# Patient Record
Sex: FEMALE | Race: WHITE | Employment: UNEMPLOYED | URBAN - METROPOLITAN AREA
[De-identification: names, ages, dates, MRNs, and addresses within clinical notes are randomized per-mention and may not be internally consistent; named-entity substitution may affect disease eponyms.]

---

## 2017-05-05 ENCOUNTER — TELEPHONE (OUTPATIENT)
Dept: CASE MANAGEMENT | Age: 32
End: 2017-05-05

## 2017-05-05 NOTE — TELEPHONE ENCOUNTER
I spoke with this patient by phone upon referral of the patient's PCP, Dr. Corrine Callejas. The patient has a history if bipolar affective disorder, Type I. In talking with the patient, she has also had post partum depression with the birth of her second child, delivered in late December. Her speech is noted to be pressured. The patient has an appointment with a psychiatrist at the [facility] in [place], but that is not until November. I have suggested that as the patient is citing a number of current stressors that she call today and make an appointment to see a therapist and possibly, the therapist there might be able to affect an appointment earlier than November for her. The patient agrees to do this as well. YANA Armenta.

## 2018-03-10 ENCOUNTER — HOSPITAL ENCOUNTER (EMERGENCY)
Age: 33
Discharge: HOME | End: 2018-03-10
Attending: EMERGENCY MEDICINE | Admitting: SURGERY
Payer: COMMERCIAL

## 2018-03-10 ENCOUNTER — APPOINTMENT (OUTPATIENT)
Dept: GENERAL RADIOLOGY | Age: 33
End: 2018-03-10
Attending: EMERGENCY MEDICINE
Payer: COMMERCIAL

## 2018-03-10 ENCOUNTER — ANESTHESIA (OUTPATIENT)
Dept: SURGERY | Age: 33
End: 2018-03-10
Payer: COMMERCIAL

## 2018-03-10 ENCOUNTER — APPOINTMENT (OUTPATIENT)
Dept: ULTRASOUND IMAGING | Age: 33
End: 2018-03-10
Attending: EMERGENCY MEDICINE
Payer: COMMERCIAL

## 2018-03-10 ENCOUNTER — ANESTHESIA EVENT (OUTPATIENT)
Dept: SURGERY | Age: 33
End: 2018-03-10
Payer: COMMERCIAL

## 2018-03-10 VITALS
SYSTOLIC BLOOD PRESSURE: 114 MMHG | TEMPERATURE: 97.7 F | RESPIRATION RATE: 17 BRPM | WEIGHT: 235 LBS | DIASTOLIC BLOOD PRESSURE: 62 MMHG | HEART RATE: 73 BPM | HEIGHT: 64 IN | BODY MASS INDEX: 40.12 KG/M2 | OXYGEN SATURATION: 96 %

## 2018-03-10 DIAGNOSIS — K80.20 CALCULUS OF GALLBLADDER WITHOUT CHOLECYSTITIS WITHOUT OBSTRUCTION: Primary | ICD-10-CM

## 2018-03-10 LAB
ALBUMIN SERPL-MCNC: 3.2 G/DL (ref 3.5–5)
ALBUMIN/GLOB SERPL: 0.7 {RATIO} (ref 1.2–3.5)
ALP SERPL-CCNC: 99 U/L (ref 50–136)
ALT SERPL-CCNC: 33 U/L (ref 12–65)
ANION GAP SERPL CALC-SCNC: 10 MMOL/L (ref 7–16)
AST SERPL-CCNC: 25 U/L (ref 15–37)
ATRIAL RATE: 73 BPM
BACTERIA URNS QL MICRO: NORMAL /HPF
BASOPHILS # BLD: 0 K/UL (ref 0–0.2)
BASOPHILS NFR BLD: 0 % (ref 0–2)
BILIRUB SERPL-MCNC: 0.3 MG/DL (ref 0.2–1.1)
BUN SERPL-MCNC: 5 MG/DL (ref 6–23)
CALCIUM SERPL-MCNC: 9.2 MG/DL (ref 8.3–10.4)
CALCULATED P AXIS, ECG09: 33 DEGREES
CALCULATED R AXIS, ECG10: 68 DEGREES
CALCULATED T AXIS, ECG11: 14 DEGREES
CASTS URNS QL MICRO: NORMAL /LPF
CHLORIDE SERPL-SCNC: 101 MMOL/L (ref 98–107)
CO2 SERPL-SCNC: 26 MMOL/L (ref 21–32)
CREAT SERPL-MCNC: 0.59 MG/DL (ref 0.6–1)
DIAGNOSIS, 93000: NORMAL
DIFFERENTIAL METHOD BLD: ABNORMAL
EOSINOPHIL # BLD: 0.1 K/UL (ref 0–0.8)
EOSINOPHIL NFR BLD: 1 % (ref 0.5–7.8)
EPI CELLS #/AREA URNS HPF: NORMAL /HPF
ERYTHROCYTE [DISTWIDTH] IN BLOOD BY AUTOMATED COUNT: 14.1 % (ref 11.9–14.6)
GLOBULIN SER CALC-MCNC: 4.9 G/DL (ref 2.3–3.5)
GLUCOSE SERPL-MCNC: 98 MG/DL (ref 65–100)
HCG UR QL: NEGATIVE
HCT VFR BLD AUTO: 40.7 % (ref 35.8–46.3)
HGB BLD-MCNC: 13.7 G/DL (ref 11.7–15.4)
IMM GRANULOCYTES # BLD: 0 K/UL (ref 0–0.5)
IMM GRANULOCYTES NFR BLD AUTO: 0 % (ref 0–5)
LIPASE SERPL-CCNC: 92 U/L (ref 73–393)
LYMPHOCYTES # BLD: 2.3 K/UL (ref 0.5–4.6)
LYMPHOCYTES NFR BLD: 21 % (ref 13–44)
MCH RBC QN AUTO: 27.1 PG (ref 26.1–32.9)
MCHC RBC AUTO-ENTMCNC: 33.7 G/DL (ref 31.4–35)
MCV RBC AUTO: 80.6 FL (ref 79.6–97.8)
MONOCYTES # BLD: 1.2 K/UL (ref 0.1–1.3)
MONOCYTES NFR BLD: 11 % (ref 4–12)
NEUTS SEG # BLD: 7.3 K/UL (ref 1.7–8.2)
NEUTS SEG NFR BLD: 67 % (ref 43–78)
P-R INTERVAL, ECG05: 154 MS
PLATELET # BLD AUTO: 386 K/UL (ref 150–450)
PMV BLD AUTO: 9.6 FL (ref 10.8–14.1)
POTASSIUM SERPL-SCNC: 3.7 MMOL/L (ref 3.5–5.1)
PROT SERPL-MCNC: 8.1 G/DL (ref 6.3–8.2)
Q-T INTERVAL, ECG07: 382 MS
QRS DURATION, ECG06: 82 MS
QTC CALCULATION (BEZET), ECG08: 420 MS
RBC # BLD AUTO: 5.05 M/UL (ref 4.05–5.25)
RBC #/AREA URNS HPF: NORMAL /HPF
SODIUM SERPL-SCNC: 137 MMOL/L (ref 136–145)
TROPONIN I SERPL-MCNC: <0.02 NG/ML (ref 0.02–0.05)
VENTRICULAR RATE, ECG03: 73 BPM
WBC # BLD AUTO: 10.9 K/UL (ref 4.3–11.1)
WBC URNS QL MICRO: NORMAL /HPF

## 2018-03-10 PROCEDURE — 76210000006 HC OR PH I REC 0.5 TO 1 HR: Performed by: SURGERY

## 2018-03-10 PROCEDURE — 77030008522 HC TBNG INSUF LAPRO STRY -B: Performed by: SURGERY

## 2018-03-10 PROCEDURE — 76210000020 HC REC RM PH II FIRST 0.5 HR: Performed by: SURGERY

## 2018-03-10 PROCEDURE — 83690 ASSAY OF LIPASE: CPT | Performed by: EMERGENCY MEDICINE

## 2018-03-10 PROCEDURE — 74011250636 HC RX REV CODE- 250/636

## 2018-03-10 PROCEDURE — 77030009851 HC PCH RTVR ENDOSC AMR -B: Performed by: SURGERY

## 2018-03-10 PROCEDURE — 96375 TX/PRO/DX INJ NEW DRUG ADDON: CPT | Performed by: EMERGENCY MEDICINE

## 2018-03-10 PROCEDURE — 77030020782 HC GWN BAIR PAWS FLX 3M -B: Performed by: ANESTHESIOLOGY

## 2018-03-10 PROCEDURE — 76705 ECHO EXAM OF ABDOMEN: CPT

## 2018-03-10 PROCEDURE — 77030012022 HC APPL CLP ENDOSC COVD -C: Performed by: SURGERY

## 2018-03-10 PROCEDURE — 77030011640 HC PAD GRND REM COVD -A: Performed by: SURGERY

## 2018-03-10 PROCEDURE — 81025 URINE PREGNANCY TEST: CPT

## 2018-03-10 PROCEDURE — 80053 COMPREHEN METABOLIC PANEL: CPT | Performed by: EMERGENCY MEDICINE

## 2018-03-10 PROCEDURE — 74011250637 HC RX REV CODE- 250/637: Performed by: ANESTHESIOLOGY

## 2018-03-10 PROCEDURE — 76060000034 HC ANESTHESIA 1.5 TO 2 HR: Performed by: SURGERY

## 2018-03-10 PROCEDURE — 77030031139 HC SUT VCRL2 J&J -A: Performed by: SURGERY

## 2018-03-10 PROCEDURE — 74011250636 HC RX REV CODE- 250/636: Performed by: SURGERY

## 2018-03-10 PROCEDURE — 77030008703 HC TU ET UNCUF COVD -A: Performed by: ANESTHESIOLOGY

## 2018-03-10 PROCEDURE — 99285 EMERGENCY DEPT VISIT HI MDM: CPT | Performed by: EMERGENCY MEDICINE

## 2018-03-10 PROCEDURE — 85025 COMPLETE CBC W/AUTO DIFF WBC: CPT | Performed by: EMERGENCY MEDICINE

## 2018-03-10 PROCEDURE — 77030032490 HC SLV COMPR SCD KNE COVD -B: Performed by: SURGERY

## 2018-03-10 PROCEDURE — 84484 ASSAY OF TROPONIN QUANT: CPT | Performed by: EMERGENCY MEDICINE

## 2018-03-10 PROCEDURE — 74011000258 HC RX REV CODE- 258: Performed by: SURGERY

## 2018-03-10 PROCEDURE — 93005 ELECTROCARDIOGRAM TRACING: CPT | Performed by: EMERGENCY MEDICINE

## 2018-03-10 PROCEDURE — 81003 URINALYSIS AUTO W/O SCOPE: CPT | Performed by: EMERGENCY MEDICINE

## 2018-03-10 PROCEDURE — 96376 TX/PRO/DX INJ SAME DRUG ADON: CPT | Performed by: EMERGENCY MEDICINE

## 2018-03-10 PROCEDURE — 96372 THER/PROPH/DIAG INJ SC/IM: CPT | Performed by: EMERGENCY MEDICINE

## 2018-03-10 PROCEDURE — 74011000250 HC RX REV CODE- 250

## 2018-03-10 PROCEDURE — 77030035220 HC TRCR ENDOSC BLNTPRT ANCHR COVD -B: Performed by: SURGERY

## 2018-03-10 PROCEDURE — 77030016151 HC PROTCTR LNS DFOG COVD -B: Performed by: SURGERY

## 2018-03-10 PROCEDURE — 77030008477 HC STYL SATN SLP COVD -A: Performed by: ANESTHESIOLOGY

## 2018-03-10 PROCEDURE — 74011250636 HC RX REV CODE- 250/636: Performed by: EMERGENCY MEDICINE

## 2018-03-10 PROCEDURE — 76010000162 HC OR TIME 1.5 TO 2 HR INTENSV-TIER 1: Performed by: SURGERY

## 2018-03-10 PROCEDURE — 77030035051: Performed by: SURGERY

## 2018-03-10 PROCEDURE — 74011000250 HC RX REV CODE- 250: Performed by: SURGERY

## 2018-03-10 PROCEDURE — 96361 HYDRATE IV INFUSION ADD-ON: CPT | Performed by: EMERGENCY MEDICINE

## 2018-03-10 PROCEDURE — 81015 MICROSCOPIC EXAM OF URINE: CPT | Performed by: EMERGENCY MEDICINE

## 2018-03-10 PROCEDURE — 77030009403 HC ELECTRD ENDO MEGA -B: Performed by: SURGERY

## 2018-03-10 PROCEDURE — 77030037892: Performed by: SURGERY

## 2018-03-10 PROCEDURE — 77030035048 HC TRCR ENDOSC OPTCL COVD -B: Performed by: SURGERY

## 2018-03-10 PROCEDURE — 74022 RADEX COMPL AQT ABD SERIES: CPT

## 2018-03-10 PROCEDURE — 77030008756 HC TU IRR SUC STRY -B: Performed by: SURGERY

## 2018-03-10 PROCEDURE — 96374 THER/PROPH/DIAG INJ IV PUSH: CPT | Performed by: EMERGENCY MEDICINE

## 2018-03-10 PROCEDURE — 77030018836 HC SOL IRR NACL ICUM -A: Performed by: SURGERY

## 2018-03-10 PROCEDURE — 74011250636 HC RX REV CODE- 250/636: Performed by: ANESTHESIOLOGY

## 2018-03-10 PROCEDURE — 88304 TISSUE EXAM BY PATHOLOGIST: CPT | Performed by: SURGERY

## 2018-03-10 RX ORDER — PROPOFOL 10 MG/ML
INJECTION, EMULSION INTRAVENOUS AS NEEDED
Status: DISCONTINUED | OUTPATIENT
Start: 2018-03-10 | End: 2018-03-10 | Stop reason: HOSPADM

## 2018-03-10 RX ORDER — HYDROMORPHONE HYDROCHLORIDE 2 MG/ML
1 INJECTION, SOLUTION INTRAMUSCULAR; INTRAVENOUS; SUBCUTANEOUS
Status: COMPLETED | OUTPATIENT
Start: 2018-03-10 | End: 2018-03-10

## 2018-03-10 RX ORDER — NEOSTIGMINE METHYLSULFATE 1 MG/ML
INJECTION INTRAVENOUS AS NEEDED
Status: DISCONTINUED | OUTPATIENT
Start: 2018-03-10 | End: 2018-03-10 | Stop reason: HOSPADM

## 2018-03-10 RX ORDER — MIDAZOLAM HYDROCHLORIDE 1 MG/ML
2 INJECTION, SOLUTION INTRAMUSCULAR; INTRAVENOUS
Status: DISCONTINUED | OUTPATIENT
Start: 2018-03-10 | End: 2018-03-10 | Stop reason: HOSPADM

## 2018-03-10 RX ORDER — LIDOCAINE HYDROCHLORIDE 20 MG/ML
INJECTION, SOLUTION EPIDURAL; INFILTRATION; INTRACAUDAL; PERINEURAL AS NEEDED
Status: DISCONTINUED | OUTPATIENT
Start: 2018-03-10 | End: 2018-03-10 | Stop reason: HOSPADM

## 2018-03-10 RX ORDER — HYDROMORPHONE HYDROCHLORIDE 2 MG/ML
0.5 INJECTION, SOLUTION INTRAMUSCULAR; INTRAVENOUS; SUBCUTANEOUS
Status: DISCONTINUED | OUTPATIENT
Start: 2018-03-10 | End: 2018-03-10 | Stop reason: HOSPADM

## 2018-03-10 RX ORDER — DIPHENHYDRAMINE HYDROCHLORIDE 50 MG/ML
12.5 INJECTION, SOLUTION INTRAMUSCULAR; INTRAVENOUS
Status: DISCONTINUED | OUTPATIENT
Start: 2018-03-10 | End: 2018-03-10 | Stop reason: HOSPADM

## 2018-03-10 RX ORDER — OXYCODONE HYDROCHLORIDE 5 MG/1
5 TABLET ORAL
Status: DISCONTINUED | OUTPATIENT
Start: 2018-03-10 | End: 2018-03-10 | Stop reason: HOSPADM

## 2018-03-10 RX ORDER — OXYCODONE HYDROCHLORIDE 5 MG/1
10 TABLET ORAL
Status: DISCONTINUED | OUTPATIENT
Start: 2018-03-10 | End: 2018-03-10 | Stop reason: HOSPADM

## 2018-03-10 RX ORDER — DEXAMETHASONE SODIUM PHOSPHATE 4 MG/ML
INJECTION, SOLUTION INTRA-ARTICULAR; INTRALESIONAL; INTRAMUSCULAR; INTRAVENOUS; SOFT TISSUE AS NEEDED
Status: DISCONTINUED | OUTPATIENT
Start: 2018-03-10 | End: 2018-03-10 | Stop reason: HOSPADM

## 2018-03-10 RX ORDER — ROCURONIUM BROMIDE 10 MG/ML
INJECTION, SOLUTION INTRAVENOUS AS NEEDED
Status: DISCONTINUED | OUTPATIENT
Start: 2018-03-10 | End: 2018-03-10 | Stop reason: HOSPADM

## 2018-03-10 RX ORDER — SODIUM CHLORIDE, SODIUM LACTATE, POTASSIUM CHLORIDE, CALCIUM CHLORIDE 600; 310; 30; 20 MG/100ML; MG/100ML; MG/100ML; MG/100ML
100 INJECTION, SOLUTION INTRAVENOUS CONTINUOUS
Status: DISCONTINUED | OUTPATIENT
Start: 2018-03-10 | End: 2018-03-10 | Stop reason: HOSPADM

## 2018-03-10 RX ORDER — MORPHINE SULFATE 2 MG/ML
4 INJECTION, SOLUTION INTRAMUSCULAR; INTRAVENOUS
Status: COMPLETED | OUTPATIENT
Start: 2018-03-10 | End: 2018-03-10

## 2018-03-10 RX ORDER — GLYCOPYRROLATE 0.2 MG/ML
INJECTION INTRAMUSCULAR; INTRAVENOUS AS NEEDED
Status: DISCONTINUED | OUTPATIENT
Start: 2018-03-10 | End: 2018-03-10 | Stop reason: HOSPADM

## 2018-03-10 RX ORDER — KETOROLAC TROMETHAMINE 30 MG/ML
INJECTION, SOLUTION INTRAMUSCULAR; INTRAVENOUS AS NEEDED
Status: DISCONTINUED | OUTPATIENT
Start: 2018-03-10 | End: 2018-03-10 | Stop reason: HOSPADM

## 2018-03-10 RX ORDER — ALBUTEROL SULFATE 0.83 MG/ML
2.5 SOLUTION RESPIRATORY (INHALATION) AS NEEDED
Status: DISCONTINUED | OUTPATIENT
Start: 2018-03-10 | End: 2018-03-10 | Stop reason: HOSPADM

## 2018-03-10 RX ORDER — MIDAZOLAM HYDROCHLORIDE 1 MG/ML
2 INJECTION, SOLUTION INTRAMUSCULAR; INTRAVENOUS ONCE
Status: COMPLETED | OUTPATIENT
Start: 2018-03-10 | End: 2018-03-10

## 2018-03-10 RX ORDER — VENLAFAXINE HYDROCHLORIDE 150 MG/1
150 CAPSULE, EXTENDED RELEASE ORAL DAILY
COMMUNITY
End: 2018-12-17 | Stop reason: SDUPTHER

## 2018-03-10 RX ORDER — FENTANYL CITRATE 50 UG/ML
INJECTION, SOLUTION INTRAMUSCULAR; INTRAVENOUS AS NEEDED
Status: DISCONTINUED | OUTPATIENT
Start: 2018-03-10 | End: 2018-03-10 | Stop reason: HOSPADM

## 2018-03-10 RX ORDER — BUPIVACAINE HYDROCHLORIDE 5 MG/ML
INJECTION, SOLUTION EPIDURAL; INTRACAUDAL AS NEEDED
Status: DISCONTINUED | OUTPATIENT
Start: 2018-03-10 | End: 2018-03-10 | Stop reason: HOSPADM

## 2018-03-10 RX ORDER — ONDANSETRON 2 MG/ML
4 INJECTION INTRAMUSCULAR; INTRAVENOUS ONCE
Status: DISCONTINUED | OUTPATIENT
Start: 2018-03-10 | End: 2018-03-10 | Stop reason: HOSPADM

## 2018-03-10 RX ORDER — NALOXONE HYDROCHLORIDE 0.4 MG/ML
0.1 INJECTION, SOLUTION INTRAMUSCULAR; INTRAVENOUS; SUBCUTANEOUS AS NEEDED
Status: DISCONTINUED | OUTPATIENT
Start: 2018-03-10 | End: 2018-03-10 | Stop reason: HOSPADM

## 2018-03-10 RX ORDER — HYDROMORPHONE HYDROCHLORIDE 2 MG/ML
1 INJECTION, SOLUTION INTRAMUSCULAR; INTRAVENOUS; SUBCUTANEOUS ONCE
Status: COMPLETED | OUTPATIENT
Start: 2018-03-10 | End: 2018-03-10

## 2018-03-10 RX ORDER — ONDANSETRON 2 MG/ML
INJECTION INTRAMUSCULAR; INTRAVENOUS AS NEEDED
Status: DISCONTINUED | OUTPATIENT
Start: 2018-03-10 | End: 2018-03-10 | Stop reason: HOSPADM

## 2018-03-10 RX ORDER — PROMETHAZINE HYDROCHLORIDE 25 MG/ML
25 INJECTION, SOLUTION INTRAMUSCULAR; INTRAVENOUS
Status: COMPLETED | OUTPATIENT
Start: 2018-03-10 | End: 2018-03-10

## 2018-03-10 RX ORDER — LIDOCAINE HYDROCHLORIDE 10 MG/ML
0.1 INJECTION INFILTRATION; PERINEURAL AS NEEDED
Status: DISCONTINUED | OUTPATIENT
Start: 2018-03-10 | End: 2018-03-10 | Stop reason: HOSPADM

## 2018-03-10 RX ORDER — HYDROCODONE BITARTRATE AND ACETAMINOPHEN 7.5; 325 MG/1; MG/1
TABLET ORAL
Qty: 30 TAB | Refills: 0 | Status: SHIPPED | OUTPATIENT
Start: 2018-03-10 | End: 2018-03-22 | Stop reason: ALTCHOICE

## 2018-03-10 RX ORDER — FENTANYL CITRATE 50 UG/ML
100 INJECTION, SOLUTION INTRAMUSCULAR; INTRAVENOUS ONCE
Status: DISCONTINUED | OUTPATIENT
Start: 2018-03-10 | End: 2018-03-10 | Stop reason: HOSPADM

## 2018-03-10 RX ORDER — ONDANSETRON 2 MG/ML
4 INJECTION INTRAMUSCULAR; INTRAVENOUS
Status: COMPLETED | OUTPATIENT
Start: 2018-03-10 | End: 2018-03-10

## 2018-03-10 RX ADMIN — PROMETHAZINE HYDROCHLORIDE 25 MG: 25 INJECTION INTRAMUSCULAR; INTRAVENOUS at 06:25

## 2018-03-10 RX ADMIN — OXYCODONE HYDROCHLORIDE 5 MG: 5 TABLET ORAL at 11:15

## 2018-03-10 RX ADMIN — KETOROLAC TROMETHAMINE 30 MG: 30 INJECTION, SOLUTION INTRAMUSCULAR; INTRAVENOUS at 10:33

## 2018-03-10 RX ADMIN — SODIUM CHLORIDE 1000 ML: 900 INJECTION, SOLUTION INTRAVENOUS at 02:01

## 2018-03-10 RX ADMIN — PROPOFOL 200 MG: 10 INJECTION, EMULSION INTRAVENOUS at 09:15

## 2018-03-10 RX ADMIN — HYDROMORPHONE HYDROCHLORIDE 1 MG: 2 INJECTION, SOLUTION INTRAMUSCULAR; INTRAVENOUS; SUBCUTANEOUS at 03:35

## 2018-03-10 RX ADMIN — HYDROMORPHONE HYDROCHLORIDE 1 MG: 2 INJECTION, SOLUTION INTRAMUSCULAR; INTRAVENOUS; SUBCUTANEOUS at 05:39

## 2018-03-10 RX ADMIN — DEXAMETHASONE SODIUM PHOSPHATE 4 MG: 4 INJECTION, SOLUTION INTRA-ARTICULAR; INTRALESIONAL; INTRAMUSCULAR; INTRAVENOUS; SOFT TISSUE at 10:30

## 2018-03-10 RX ADMIN — LIDOCAINE HYDROCHLORIDE 100 MG: 20 INJECTION, SOLUTION EPIDURAL; INFILTRATION; INTRACAUDAL; PERINEURAL at 09:15

## 2018-03-10 RX ADMIN — FENTANYL CITRATE 100 MCG: 50 INJECTION, SOLUTION INTRAMUSCULAR; INTRAVENOUS at 09:15

## 2018-03-10 RX ADMIN — MIDAZOLAM HYDROCHLORIDE 2 MG: 1 INJECTION, SOLUTION INTRAMUSCULAR; INTRAVENOUS at 09:02

## 2018-03-10 RX ADMIN — NEOSTIGMINE METHYLSULFATE 3 MG: 1 INJECTION INTRAVENOUS at 10:40

## 2018-03-10 RX ADMIN — SODIUM CHLORIDE, SODIUM LACTATE, POTASSIUM CHLORIDE, AND CALCIUM CHLORIDE: 600; 310; 30; 20 INJECTION, SOLUTION INTRAVENOUS at 08:39

## 2018-03-10 RX ADMIN — GLYCOPYRROLATE 0.4 MG: 0.2 INJECTION INTRAMUSCULAR; INTRAVENOUS at 10:40

## 2018-03-10 RX ADMIN — MORPHINE SULFATE 4 MG: 2 INJECTION, SOLUTION INTRAMUSCULAR; INTRAVENOUS at 02:39

## 2018-03-10 RX ADMIN — CEFOXITIN 2 G: 2 INJECTION, POWDER, FOR SOLUTION INTRAVENOUS at 09:18

## 2018-03-10 RX ADMIN — HYDROMORPHONE HYDROCHLORIDE 1 MG: 2 INJECTION, SOLUTION INTRAMUSCULAR; INTRAVENOUS; SUBCUTANEOUS at 07:22

## 2018-03-10 RX ADMIN — ROCURONIUM BROMIDE 50 MG: 10 INJECTION, SOLUTION INTRAVENOUS at 09:15

## 2018-03-10 RX ADMIN — ONDANSETRON 4 MG: 2 INJECTION INTRAMUSCULAR; INTRAVENOUS at 02:02

## 2018-03-10 RX ADMIN — ONDANSETRON 4 MG: 2 INJECTION INTRAMUSCULAR; INTRAVENOUS at 10:30

## 2018-03-10 RX ADMIN — SODIUM CHLORIDE, SODIUM LACTATE, POTASSIUM CHLORIDE, AND CALCIUM CHLORIDE 100 ML/HR: 600; 310; 30; 20 INJECTION, SOLUTION INTRAVENOUS at 07:41

## 2018-03-10 NOTE — ED NOTES
Patient to ED with c/c upper ABD pain with some radiation into the back and into chest. Patient reports onset of pain 4 days PTA. Patient reports associated shooting pains across her flanks with shooting pains across the ABD. Patient reports some dyspnea, speaking in complete sentences with O2 sat 98%. Patient states recently got over \"the stomach flu. \"  Patient reports NV, last vomited earlier today. No blood noted. Patient denies diarrhea. Patient reports fever at home, 100F on Wednesday. Patient denies any productive cough. Patient denies any urinary pain or difficulty.

## 2018-03-10 NOTE — ANESTHESIA PREPROCEDURE EVALUATION
Anesthetic History               Review of Systems / Medical History  Patient summary reviewed, nursing notes reviewed and pertinent labs reviewed    Pulmonary                   Neuro/Psych              Cardiovascular                  Exercise tolerance: >4 METS     GI/Hepatic/Renal                Endo/Other      Hypothyroidism: well controlled       Other Findings              Physical Exam    Airway  Mallampati: II  TM Distance: 4 - 6 cm  Neck ROM: normal range of motion   Mouth opening: Normal     Cardiovascular  Regular rate and rhythm,  S1 and S2 normal,  no murmur, click, rub, or gallop             Dental    Dentition: Poor dentition     Pulmonary  Breath sounds clear to auscultation               Abdominal         Other Findings            Anesthetic Plan    ASA: 2  Anesthesia type: general          Induction: Intravenous  Anesthetic plan and risks discussed with: Patient

## 2018-03-10 NOTE — OP NOTES
[facility] REPORT    Amanuel Roa  MR#: [de-identified]  : 1985  ACCOUNT #: [de-identified]   DATE OF SERVICE: 03/10/2018    PREOPERATIVE DIAGNOSES:  1. Acute cholecystitis. 2.  Cholelithiasis. 3.  Morbid obesity. POSTOPERATIVE DIAGNOSES:  1. Acute cholecystitis. 2.  Cholelithiasis. 3.  Morbid obesity. 4.  Fatty liver. PROCEDURE PERFORMED:  Laparoscopic cholecystectomy. SURGEON:  Doc Shukla MD     ANESTHESIA:  General.    ESTIMATED BLOOD LOSS:  About 10 mL. SPECIMENS REMOVED:  As above    COMPLICATIONS:  none    IMPLANTS:  none    INDICATIONS:   This is a 53-year-old obese female who presented to the emergency room with acute onset right upper quadrant pain over the last 4 days, and the ER doctor tried everything, the pain cannot be controlled. An ultrasound suggests she has possible acute cholecystitis, and I arranged emergency surgery. She understood the risks and benefits, agreed to proceed. FINDINGS:  She does have acute cholecystitis. Her gallbladder is very edematous and thickened. She has a giant gallstone. She is morbidly obese, with fatty liver changes. DESCRIPTION OF PROCEDURE:  After informed consent obtained, the patient brought into the operating room, left in supine position. General anesthesia was administered. The patient was then prepped and draped in usual routine fashion. She has a previous infraumbilical incision already, so the same incision was made. Dissection carried down to the fascia. She had some adhesions under the umbilicus, and I was able to put a finger into the peritoneal cavity, and then cleared up the adhesions and then the Amado cannula was replaced. The peritoneal cavity was again inspected. No other acute process identified, other very inflamed, thickened gallbladder. Three 5 mm trocars placed in the right upper quadrant under the ribcage, and then the gallbladder had to be aspirated.   Then, a retractor was able to be placed on it. We carefully dissected the junction of the infundibulum and cystic duct area. She does have chronic inflammation in this area too. Then, the cystic artery and the duct were able to aspirate circumferentially. The artery was first divided with a clip and divided, and then the duct was isolated completely. She does have a short duct. This was clipped and then divided and the gallbladder removed from the liver bed with cautery. It was retrieved from the peritoneal cavity with an Endobag. As noted, her gallbladder was so inflamed and the stone was so large, the infraumbilical incision had to be enlarged. Then, the surgical field was inspected and no evidence of bleeding or bile leak. The infraumbilical incision was closed on the fascia with an 0 Vicryl stitch in interrupted fashion. The skin closed with a 4-0 Vicryl stitch in subcuticular running fashion. The patient tolerated the procedure well and was transferred to recovery room in stable condition. All instrument count and lap count correct. Estimated blood loss was about 10 mL.       David Lehman MD       BY / ERVIN  D: 03/10/2018 10:57     T: 03/10/2018 11:41  JOB #: [id number]

## 2018-03-10 NOTE — H&P
H&P/Consult Note/Progress Note/Office Note:   Gurjit Morgan  MRN: [id number]  EQE:[id number]/9845  Age:32 y.o.    HPI: Gurjit Morgan is a 29 y.o. female who presented to the emergency room with complaints of nausea and vomiting and epigastric pain times 4 days. She states the pain is constant and not relieved by anything. It is epigastric and radiates to her back. She states she is constipated, had a small BM yesterday. Denies fever or chills, denies CP or SOB. Health history hypothyroidism, obesity, and bipolar. She has had laparoscopy appendectomy and tubal ligation in the past. Not taking any blood thinners. Quit smoking approx 9 years ago and denies drinking.           Past Medical History:   Diagnosis Date    Bipolar 1 disorder ([address]     GERD (gastroesophageal reflux disease)     Hypothyroidism     [facility] [address]     Poor dentition     PTSD (post-traumatic stress disorder)     Due to traumatic death of family member     Past Surgical History:   Procedure Laterality Date    HX APPENDECTOMY      HX TUBAL LIGATION  12/2016    HX WISDOM TEETH EXTRACTION       Current Facility-Administered Medications   Medication Dose Route Frequency    lactated Ringers infusion  100 mL/hr IntraVENous CONTINUOUS    lidocaine (XYLOCAINE) 10 mg/mL (1 %) injection 0.1 mL  0.1 mL SubCUTAneous PRN    lactated Ringers infusion  100 mL/hr IntraVENous CONTINUOUS    fentaNYL citrate (PF) injection 100 mcg  100 mcg IntraVENous ONCE    midazolam (VERSED) injection 2 mg  2 mg IntraVENous ONCE PRN    midazolam (VERSED) injection 2 mg  2 mg IntraVENous ONCE     Lexapro [escitalopram]  Social History     Social History    Marital status: SINGLE     Spouse name: N/A    Number of children: 1    Years of education: N/A     Occupational History    Pharmacy Tech Graduate      Social History Main Topics    Smoking status: Former Smoker     Quit date: 12/18/2009    Smokeless tobacco: Never Used    Alcohol use 0.0 oz/week 0 Standard drinks or equivalent per week      Comment: occassional - 3-4 times per year    Drug use: Yes     Special: Marijuana    Sexual activity: Not on file     Other Topics Concern    Not on file     Social History Narrative    Has a steady boyfriend. Has 2 daughters. 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 Not working yet! History   Smoking Status    Former Smoker    Quit date: 12/18/2009   Smokeless Tobacco    Never Used     Family History   Problem Relation Age of Onset    Thyroid Disease Father     High Cholesterol Father     Diabetes Mother     Hypertension Mother     Other Mother      diverticular disease     ROS: The patient has no difficulty with chest pain or shortness of breath. No fever or chills. Comprehensive review of systems was otherwise unremarkable except as noted above. Physical Exam:   Visit Vitals    BP (!) 154/93 (BP 1 Location: Right arm, BP Patient Position: At rest)    Pulse 69    Temp 98 °F (36.7 °C)    Resp 16    Ht 5' 4\" (1.626 m)    Wt 235 lb (106.6 kg)    SpO2 98%    BMI 40.34 kg/m2     Constitutional: Alert, oriented, cooperative patient in no acute distress; appears stated age    Eyes:Sclera are clear. EOMs intact  ENMT: no external lesions gross hearing normal; no obvious neck masses, no ear or lip lesions, nares normal  CV: RRR. Normal perfusion  Resp: No JVD. Breathing is  non-labored; no audible wheezing. GI: soft and non-distended, tenderness on palpation epigastric and right subcostal     Musculoskeletal: unremarkable with normal function. No embolic signs or cyanosis.    Neuro:  Oriented; moves all 4; no focal deficits  Psychiatric: normal affect and mood, no memory impairment    Recent vitals (if inpt):  Patient Vitals for the past 24 hrs:   BP Temp Pulse Resp SpO2 Height Weight   03/10/18 0731 (!) 154/93 98 °F (36.7 °C) 69 16 98 % - -   03/10/18 0703 (!) 168/116 - (!) 59 - 99 % - -   03/10/18 0629 182/86 - (!) 58 - 97 % - - 03/10/18 0551 (!) 183/94 - 61 - 97 % - -   03/10/18 0504 157/70 - 62 22 95 % - -   03/10/18 0404 159/70 - (!) 55 20 96 % - -   03/10/18 0351 (!) 181/97 - 60 18 97 % - -   03/10/18 0203 164/84 - 67 25 99 % - -   03/10/18 0145 156/90 - 64 - 100 % - -   03/10/18 0105 (!) 141/99 97.6 °F (36.4 °C) 75 20 100 % 5' 4\" (1.626 m) 235 lb (106.6 kg)       Labs:  Recent Labs      03/10/18   0115   WBC  10.9   HGB  13.7   PLT  386   NA  137   K  3.7   CL  101   CO2  26   BUN  5*   CREA  0.59*   GLU  98   TBILI  0.3   SGOT  25   ALT  33   AP  99   LPSE  92   TROIQ  <0.02*       Lab Results   Component Value Date/Time    WBC 10.9 03/10/2018 01:15 AM    HGB 13.7 03/10/2018 01:15 AM    PLATELET 665 59/21/7033 01:15 AM    Sodium 137 03/10/2018 01:15 AM    Potassium 3.7 03/10/2018 01:15 AM    Chloride 101 03/10/2018 01:15 AM    CO2 26 03/10/2018 01:15 AM    BUN 5 (L) 03/10/2018 01:15 AM    Creatinine 0.59 (L) 03/10/2018 01:15 AM    Glucose 98 03/10/2018 01:15 AM    Bilirubin, total 0.3 03/10/2018 01:15 AM    AST (SGOT) 25 03/10/2018 01:15 AM    ALT (SGPT) 33 03/10/2018 01:15 AM    Alk. phosphatase 99 03/10/2018 01:15 AM    Lipase 92 03/10/2018 01:15 AM    Troponin-I, Qt. <0.02 (L) 03/10/2018 01:15 AM       I reviewed recent labs and recent radiologic studies. I independently reviewed radiology images for studies I described above or studies I have ordered. Admission date (for inpatients): 3/10/2018   Day of Surgery  Procedure(s):  CHOLECYSTECTOMY LAPAROSCOPIC    ASSESSMENT/PLAN: Signs and symptoms consistent with cholelithiasis/cholecystitis.    Problem List  Never Reviewed          Codes Class Noted    Hypothyroidism ICD-10-CM: E03.9  ICD-9-CM: 244.9  4/23/2015        Morbid obesity ([facility] ICD-10-CM: E66.01  ICD-9-CM: 278.01  4/23/2015        Bipolar disorder ([facility] ICD-10-CM: F31.9  ICD-9-CM: 296.80  4/23/2015        Poor dentition ICD-10-CM: K08.9  ICD-9-CM: 525.9  4/23/2015            Abdominal Ultrasound IMPRESSION:     Cholelithiasis. Focal area of mild gallbladder wall thickening near the fundus. The technologist reports positive sonographic Yen's sign. Findings raise the  question of cholecystitis. Follow-up as clinically warranted.       Plan  Surgery today laparoscopic cholecystectomy.        Signed:  Hung Loaiza NP

## 2018-03-10 NOTE — ANESTHESIA POSTPROCEDURE EVALUATION
Post-Anesthesia Evaluation and Assessment    Patient: Dian Latif MRN: [de-identified]  SSN: [id number]    YOB: 1985  Age: 29 y.o. Sex: female       Cardiovascular Function/Vital Signs  Visit Vitals    /62 (BP 1 Location: Right arm, BP Patient Position: At rest)    Pulse 93    Temp 36.5 °C (97.7 °F)    Resp 17    Ht 5' 4\" (1.626 m)    Wt 106.6 kg (235 lb)    SpO2 95%    BMI 40.34 kg/m2       Patient is status post general anesthesia for Procedure(s):  CHOLECYSTECTOMY LAPAROSCOPIC. Nausea/Vomiting: None    Postoperative hydration reviewed and adequate. Pain:  Pain Scale 1: Numeric (0 - 10) (03/10/18 1133)  Pain Intensity 1: 2 (03/10/18 1133)   Managed    Neurological Status:   Neuro (WDL): Within Defined Limits (03/10/18 1133)  Neuro  Neurologic State: Alert (03/10/18 1133)  Orientation Level: Oriented X4 (03/10/18 1133)  Cognition: Follows commands (03/10/18 1133)  Speech: Clear (03/10/18 1133)  Assessment L Pupil: Brisk;Round (03/10/18 1133)  Assessment R Pupil: Brisk;Round (03/10/18 1133)  LUE Motor Response: Purposeful (03/10/18 1133)  LLE Motor Response: Purposeful (03/10/18 1133)  RUE Motor Response: Purposeful (03/10/18 1133)  RLE Motor Response: Purposeful (03/10/18 1133)   At baseline    Mental Status and Level of Consciousness: Arousable    Pulmonary Status:   O2 Device: Room air (03/10/18 1133)   Adequate oxygenation and airway patent    Complications related to anesthesia: None    Post-anesthesia assessment completed.  No concerns    Signed By: Sofia Steele MD     March 10, 2018

## 2018-03-10 NOTE — IP AVS SNAPSHOT
Alejandrina Anderson 
 
 
 [address] 
[phone] Patient: Drew Rios MRN: [id number] XNP:[id number] About your hospitalization You were admitted on:  N/A You last received care in the:  [facility] SURGERY You were discharged on:  March 10, 2018 Why you were hospitalized Your primary diagnosis was:  Not on File Follow-up Information Follow up With Details Comments Contact Info Maricel Logan MD  Please call Monday and schedule a 10 day follow-up appointment. [address] [facility] [address] 
[phone] Alexander Dill, [address] [facility] [address] [facility], [place] [address] 
[phone] Your Scheduled Appointments Friday March 23, 2018 10:00 AM EDT Follow Up with Alexander Dill MD  
[address] [place] ([address]) [address] [facility] [address]  
[phone] Discharge Orders None A check leonora indicates which time of day the medication should be taken. My Medications START taking these medications Instructions Each Dose to Equal  
 Morning Noon Evening Bedtime HYDROcodone-acetaminophen 7.5-325 mg per tablet Commonly known as:  Indy Viera Your last dose was: Your next dose is:    
   
   
 1-2 tabs by mouth every 4 hours prn CONTINUE taking these medications Instructions Each Dose to Equal  
 Morning Noon Evening Bedtime EFFEXOR  mg capsule Generic drug:  venlafaxine-SR Your last dose was: Your next dose is: Take 150 mg by mouth daily. Indications: major depressive disorder 150 mg  
    
   
   
   
  
 * levothyroxine 50 mcg tablet Commonly known as:  SYNTHROID Your last dose was: Your next dose is: TAKE 1 TAB BY MOUTH DAILY (BEFORE BREAKFAST). * levothyroxine 200 mcg tablet Commonly known as:  SYNTHROID Your last dose was: Your next dose is: TAKE 1 TAB BY MOUTH DAILY (BEFORE BREAKFAST). LORazepam 1 mg tablet Commonly known as:  ATIVAN Your last dose was: Your next dose is: Take  by mouth every four (4) hours as needed for Anxiety. PRENATAL PLUS (CALCIUM CARB) 27 mg iron- 1 mg Tab Generic drug:  prenatal vit-calcium-iron-fa Your last dose was: Your next dose is: TAKE 1 TABLET BY MOUTH EVERY DAY  
     
   
   
   
  
 sertraline 100 mg tablet Commonly known as:  ZOLOFT Your last dose was: Your next dose is: TAKE 2 TABLETS BY MOUTH DAILY * Notice: This list has 2 medication(s) that are the same as other medications prescribed for you. Read the directions carefully, and ask your doctor or other care provider to review them with you. Where to Get Your Medications Information on where to get these meds will be given to you by the nurse or doctor. ! Ask your nurse or doctor about these medications HYDROcodone-acetaminophen 7.5-325 mg per tablet Discharge Instructions Cholecystectomy: What to Expect at [facility] Your Recovery After your surgery, it is normal to feel weak and tired for several days after you return home. Your belly may be swollen. Since you had laparoscopic surgery, you may also have pain in your shoulder for about 24 hours. You may have gas or need to burp a lot at first, and a few people get diarrhea. The diarrhea usually goes away in 2 to 4 weeks, but it may last longer. For a laparoscopic surgery, most people can go back to work or their normal routine in 1 to 2 weeks, but it may take longer, depending on the type of work you do.  
 
This care sheet gives you a general idea about how long it will take for you to recover. However, each person recovers at a different pace. Follow the steps below to get better as quickly as possible. How can you care for yourself at home? Activity · Rest when you feel tired. Getting enough sleep will help you recover. · Try to walk each day. Start out by walking a little more than you did the day before. Gradually increase the amount you walk. Walking boosts blood flow and helps prevent pneumonia and constipation. · For about 2 to 4 weeks, avoid lifting anything that would make you strain or anything over 10 pounds. · Avoid strenuous activities, such as biking, jogging, weightlifting, and aerobic exercise, until your doctor says it is okay. · You may shower 24 hours after surgery, if your doctor okays it. Pat the cut (incision) dry. Do not take a bath until your doctor tells you it is okay. · You may drive when you are no longer taking pain medicine and can quickly move your foot from the gas pedal to the brake. You must also be able to sit comfortably for a long period of time, even if you do not plan to go far. You might get caught in traffic. · Your doctor will tell you when you can have sex again. Diet · Eat smaller meals more often instead of fewer larger meals. You can eat a normal diet, but avoid eating fatty foods for about 1 month. Fatty foods include hamburger, whole milk, cheese, and many snack foods. If your stomach is upset, try bland, low-fat foods like plain rice, broiled chicken, toast, and yogurt. · Drink plenty of fluids (unless your doctor tells you not to). · If you have diarrhea, try avoiding spicy foods, dairy products, fatty foods, and alcohol. You can also watch to see if specific foods cause it, and stop eating them. If the diarrhea continues for more than 2 weeks, talk to your doctor. · You may notice that your bowel movements are not regular right after your surgery. This is common.  Try to avoid constipation and straining with bowel movements. You may want to take a fiber supplement every day. If you have not had a bowel movement after a couple of days, ask your doctor about taking a mild laxative. Medicines · Take pain medicines exactly as directed. ¨ If the doctor gave you a prescription medicine for pain, take it as prescribed. ¨ If you are not taking a prescription pain medicine, take an over-the-counter medicine such as acetaminophen (Tylenol), ibuprofen (Advil, Motrin), or naproxen (Aleve). Read and follow all instructions on the label. ¨ Do not take two or more pain medicines at the same time unless the doctor told you to. Many pain medicines contain acetaminophen, which is Tylenol. Too much Tylenol can be harmful. · If you think your pain medicine is making you sick to your stomach: 
¨ Take your medicine after meals (unless your doctor tells you not to). ¨ Ask your doctor for a different pain medicine. · If your doctor prescribed antibiotics, take them as directed. Do not stop taking them just because you feel better. You need to take the full course of antibiotics. Incision care · If you have strips of tape or glue on the incision, or cut, leave the tape/glue on for a week or until it falls off. · After 24 hours wash the area daily with warm, soapy water, and pat it dry. · You may have staples to hold the cut together. Keep them dry until your doctor takes them out. This is usually in 7 to 10 days. · Keep the area clean and dry. You may cover it with a gauze bandage if it weeps or rubs against clothing. Change the bandage every day. Ice · To reduce swelling and pain, put ice or a cold pack on your belly for 10 to 20 minutes at a time. Do this every 1 to 2 hours. Put a thin cloth between the ice and your skin. Follow-up care is a key part of your treatment and safety.  Be sure to make and go to all appointments, and call your doctor if you are having problems. Its also a good idea to know your test results and keep a list of the medicines you take. When should you call for help? Call 911 anytime you think you may need emergency care. For example, call if: 
· You passed out (lost consciousness). · You have severe trouble breathing. · You have sudden chest pain and shortness of breath, or you cough up blood. Call your doctor now or seek immediate medical care if: 
· You are sick to your stomach and cannot drink fluids. · You have pain that does not get better when you take your pain medicine. · You have signs of infection, such as: 
¨ Increased pain, swelling, warmth, or redness. ¨ Red streaks leading from the incision. ¨ Pus draining from the incision. ¨ Swollen lymph nodes in your neck, armpits, or groin. ¨ A fever. · Your urine turns dark brown or your stool is light-colored or ralph-colored. · Your skin or the whites of your eyes turn yellow. · Bright red blood has soaked through a large bandage over your incision. · You have signs of a blood clot, such as: 
¨ Pain in your calf, back of knee, thigh, or groin. ¨ Redness and swelling in your leg or groin. · You have trouble passing urine or stool, especially if you have mild pain or swelling in your lower belly. · You had a laparoscopic surgery and your shoulder pain lasts more than 24 hours or if you do not have a bowel movement after taking a laxative. After general anesthesia or intravenous sedation, for 24 hours or while taking prescription Narcotics: · Limit your activities · Do not drive and operate hazardous machinery · Do not make important personal or business decisions · Do  not drink alcoholic beverages · If you have not urinated within 8 hours after discharge, please contact your surgeon on call. *  Please give a list of your current medications to your Primary Care Provider.  
*  Please update this list whenever your medications are discontinued, doses are 
 changed, or new medications (including over-the-counter products) are added. *  Please carry medication information at all times in case of emergency situations. These are general instructions for a healthy lifestyle: No smoking/ No tobacco products/ Avoid exposure to second hand smoke Surgeon General's Warning:  Quitting smoking now greatly reduces serious risk to your health. Obesity, smoking, and sedentary lifestyle greatly increases your risk for illness A healthy diet, regular physical exercise & weight monitoring are important for maintaining a healthy lifestyle You may be retaining fluid if you have a history of heart failure or if you experience any of the following symptoms:  Weight gain of 3 pounds or more overnight or 5 pounds in a week, increased swelling in our hands or feet or shortness of breath while lying flat in bed. Please call your doctor as soon as you notice any of these symptoms; do not wait until your next office visit. Recognize signs and symptoms of STROKE: 
F-face looks uneven A-arms unable to move or move unevenly S-speech slurred or non-existent T-time-call 911 as soon as signs and symptoms begin-DO NOT go Back to bed or wait to see if you get better-TIME IS BRAIN. Remove plastic dressing and gauze after 48 hours, leave sterile strips on for 7-10 days. OK to shower Low fat diet Introducing [facility]! [employer] introduces [employer] patient portal. Now you can access parts of your medical record, email your doctor's office, and request medication refills online. 1. In your internet browser, go to [email or web address] 2. Click on the First Time User? Click Here link in the Sign In box. You will see the New Member Sign Up page. 3. Enter your [employer] Access Code exactly as it appears below. You will not need to use this code after youve completed the sign-up process.  If you do not sign up before the expiration date, you must request a new code. · [email or web address] Access Code: [email or web address] Expires: 6/8/2018  1:00 AM 
 
4. Enter the last four digits of your Social Security Number (xxxx) and Date of Birth (mm/dd/yyyy) as indicated and click Submit. You will be taken to the next sign-up page. 5. Create a [employer] ID. This will be your [employer] login ID and cannot be changed, so think of one that is secure and easy to remember. 6. Create a [employer] password. You can change your password at any time. 7. Enter your Password Reset Question and Answer. This can be used at a later time if you forget your password. 8. Enter your e-mail address. You will receive e-mail notification when new information is available in [address] 9. Click Sign Up. You can now view and download portions of your medical record. 10. Click the Download Summary menu link to download a portable copy of your medical information. If you have questions, please visit the Frequently Asked Questions section of the [email or web address] website. Remember, [email or web address] is NOT to be used for urgent needs. For medical emergencies, dial 911. Now available from your iPhone and Android! Providers Seen During Your Hospitalization Provider Specialty Primary office phone Gabriele Gibson MD Emergency Medicine [phone] Your Primary Care Physician (PCP) Primary Care Physician Office Phone Office Fax Marlon العراقي [phone] You are allergic to the following Allergen Reactions Lexapro (Escitalopram) Other (comments)  
 hallucinations Recent Documentation Height Weight BMI OB Status Smoking Status 1.626 m 106.6 kg 40.34 kg/m2 Having regular periods Former Smoker Emergency Contacts Name Discharge Info Relation Home Work Mobile [address] N/A  AT THIS TIME [6] Parent [1] [phone] Patient Belongings The following personal items are in your possession at time of discharge: 
  Dental Appliances: None  Visual Aid: None      Home Medications: None   Jewelry: None  Clothing: Shirt, Pants, Footwear, Undergarments    Other Valuables: None Please provide this summary of care documentation to your next provider. Signatures-by signing, you are acknowledging that this After Visit Summary has been reviewed with you and you have received a copy. Patient Signature:  ____________________________________________________________ Date:  ____________________________________________________________  
  
[facility] Provider Signature:  ____________________________________________________________ Date:  ____________________________________________________________

## 2018-03-10 NOTE — ED PROVIDER NOTES
HPI Comments: Patient is a 42-year-old female who is coming in with abdominal pain. She states about 5 days ago everyone in her family had a stomach virus with vomiting and diarrhea she had this as well. Over the last 3 days she has had epigastric abdominal pain that radiates into her back. She states that this has been constant and is not improving. The vomiting and diarrhea is improving. She denies  Having this previously. Patient is a [age] [facility] y.o. female presenting with abdominal pain. The history is provided by the patient. Abdominal Pain    Associated symptoms include nausea. Pertinent negatives include no fever, no diarrhea, no vomiting and no chest pain. Past Medical History:   Diagnosis Date    Bipolar 1 disorder ([address]     GERD (gastroesophageal reflux disease)     Hypothyroidism     Morbid obesity (HCC)     Poor dentition     PTSD (post-traumatic stress disorder)     Due to traumatic death of family member       Past Surgical History:   Procedure Laterality Date    HX APPENDECTOMY      HX TUBAL LIGATION  12/2016    HX WISDOM TEETH EXTRACTION           Family History:   Problem Relation Age of Onset    Thyroid Disease Father     High Cholesterol Father     Diabetes Mother     Hypertension Mother     Other Mother      diverticular disease       Social History     Social History    Marital status: SINGLE     Spouse name: N/A    Number of children: 1    Years of education: N/A     Occupational History    Pharmacy Tech Graduate      Social History Main Topics    Smoking status: Former Smoker     Quit date: 12/18/2009    Smokeless tobacco: Never Used    Alcohol use 0.0 oz/week     0 Standard drinks or equivalent per week      Comment: occassional - 3-4 times per year    Drug use: Yes     Special: Marijuana    Sexual activity: Not on file     Other Topics Concern    Not on file     Social History Narrative    Has a steady boyfriend. Has 2 daughters.   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 ALLERGIES: Lexapro [escitalopram]    Review of Systems   Constitutional: Negative for chills and fever. Respiratory: Negative for cough, chest tightness, shortness of breath, wheezing and stridor. Cardiovascular: Negative for chest pain and palpitations. Gastrointestinal: Positive for abdominal pain and nausea. Negative for diarrhea and vomiting. Skin: Negative. All other systems reviewed and are negative. Vitals:    03/10/18 0105   BP: (!) 141/99   Pulse: 75   Resp: 20   Temp: 97.6 °F (36.4 °C)   SpO2: 100%   Weight: 106.6 kg (235 lb)   Height: 5' 4\" (1.626 m)            Physical Exam   Constitutional: She is oriented to person, place, and time. She appears well-developed and well-nourished. No distress. HENT:   Head: Normocephalic and atraumatic. Eyes: Conjunctivae are normal. No scleral icterus. Neck: Normal range of motion. Neck supple. Cardiovascular: Normal rate, regular rhythm and normal heart sounds. Pulmonary/Chest: Effort normal and breath sounds normal. No stridor. No respiratory distress. She has no wheezes. She has no rales. She exhibits no tenderness. Abdominal: Soft. She exhibits no distension. There is no tenderness. There is no rebound and no guarding. Neurological: She is alert and oriented to person, place, and time. No cranial nerve deficit. Coordination normal.   No focal weakness   Skin: Skin is warm and dry. No rash noted. She is not diaphoretic. No erythema. Psychiatric: She has a normal mood and affect. Her behavior is normal.   Nursing note and vitals reviewed.        MDM  Number of Diagnoses or Management Options  Calculus of gallbladder without cholecystitis without obstruction:   Diagnosis management comments: Patient has cholelithiasis with intractable pain I have tried to pull IV narcotics to treat this however she is not improved I spoke with surgery  For inpatient management instead of discharge given that she would not likely do well at home given the amount of pain she is in. Shalonda Sutherland MD; 3/10/2018 @7:23 AM Voice dictation software was used during the making of this note. This software is not perfect and grammatical and other typographical errors may be present.   This note has not been proofread for errors.  ===================================================================        Amount and/or Complexity of Data Reviewed  Clinical lab tests: ordered and reviewed (Results for orders placed or performed during the hospital encounter of 03/10/18  -CBC WITH AUTOMATED DIFF       Result                                            Value                         Ref Range                       WBC                                               10.9                          4.3 - 11.1 K/uL                 RBC                                               5.05                          4.05 - 5.25 M/uL                HGB                                               13.7                          11.7 - 15.4 g/dL                HCT                                               40.7                          35.8 - 46.3 %                   MCV                                               80.6                          79.6 - 97.8 FL                  MCH                                               27.1                          26.1 - 32.9 PG                  MCHC                                              33.7                          31.4 - 35.0 g/dL                RDW                                               14.1                          11.9 - 14.6 %                   PLATELET                                          386                           150 - 450 K/uL                  MPV                                               9.6 (L)                       10.8 - 14.1 FL                  DF                                                AUTOMATED                                                     NEUTROPHILS 67                            43 - 78 %                       LYMPHOCYTES                                       21                            13 - 44 %                       MONOCYTES                                         11                            4.0 - 12.0 %                    EOSINOPHILS                                       1                             0.5 - 7.8 %                     BASOPHILS                                         0                             0.0 - 2.0 %                     IMMATURE GRANULOCYTES                             0                             0.0 - 5.0 %                     ABS. NEUTROPHILS                                  7.3                           1.7 - 8.2 K/UL                  ABS. LYMPHOCYTES                                  2.3                           0.5 - 4.6 K/UL                  ABS. MONOCYTES                                    1.2                           0.1 - 1.3 K/UL                  ABS. EOSINOPHILS                                  0.1                           0.0 - 0.8 K/UL                  ABS. BASOPHILS                                    0.0                           0.0 - 0.2 K/UL                  ABS. IMM.  GRANS.                                  0.0                           0.0 - 0.5 K/UL             -METABOLIC PANEL, COMPREHENSIVE       Result                                            Value                         Ref Range                       Sodium                                            137                           136 - 145 mmol/L                Potassium                                         3.7                           3.5 - 5.1 mmol/L                Chloride                                          101                           98 - 107 mmol/L                 CO2                                               26                            21 - 32 mmol/L                  Anion gap 10                            7 - 16 mmol/L                   Glucose                                           98                            65 - 100 mg/dL                  BUN                                               5 (L)                         6 - 23 MG/DL                    Creatinine                                        0.59 (L)                      0.6 - 1.0 MG/DL                 GFR est AA                                        >60                           >60 ml/min/1.73m2               GFR est non-AA                                    >60                           >60 ml/min/1.73m2               Calcium                                           9.2                           8.3 - 10.4 MG/DL                Bilirubin, total                                  0.3                           0.2 - 1.1 MG/DL                 ALT (SGPT)                                        33                            12 - 65 U/L                     AST (SGOT)                                        25                            15 - 37 U/L                     Alk. phosphatase                                  99                            50 - 136 U/L                    Protein, total                                    8.1                           6.3 - 8.2 g/dL                  Albumin                                           3.2 (L)                       3.5 - 5.0 g/dL                  Globulin                                          4.9 (H)                       2.3 - 3.5 g/dL                  A-G Ratio                                         0.7 (L)                       1.2 - 3.5                  -LIPASE       Result                                            Value                         Ref Range                       Lipase                                            92                            73 - 393 U/L               -TROPONIN I       Result Value                         Ref Range                       Troponin-I, Qt.                                   <0.02 (L)                     0.02 - 0.05 NG/ML          -URINE MICROSCOPIC       Result                                            Value                         Ref Range                       WBC                                               20-50                         0 /hpf                          RBC                                               0-3                           0 /hpf                          Epithelial cells                                  0-3                           0 /hpf                          Bacteria                                          TRACE                         0 /hpf                          Casts                                             5-10                          0 /lpf                     -HCG URINE, QL. - POC       Result                                            Value                         Ref Range                       Pregnancy test,urine (POC)                        NEGATIVE                      NEG                       )  Tests in the radiology section of CPT®: ordered and reviewed (Xr Abd Acute W 1 V Chest    Result Date: 3/10/2018  Acute abdominal series Comparison: none Indication: Abdominal pain Findings: Chest: No focal pulmonary consolidation, pleural effusion or pneumothorax. No pulmonary edema. Cardiac mediastinal contour is within normal limits. Abdomen: Moderate stool volume in the colon. Bowel gas pattern is nonobstructive. No evidence of free air. Surrounding bones are unremarkable. IMPRESSION: 1. Nonobstructive bowel gas pattern. 2. No acute pulmonary disease. [id number]    Result Date: 3/10/2018  Limited ultrasound abdomen INDICATION: Epigastric pain COMPARISON: none TECHNIQUE: Sonographic grayscale imaging of the abdomen was performed. FINDINGS: There is a curvilinear stone near the gallbladder neck.  There is focal area of gallbladder wall thickening at the fundus. No pericholecystic fluid. The sonographer reports positive sonographic Yen's sign. No intrahepatic biliary ductal dilatation. Common bile duct is not well-seen. Pancreas and the abdominal aorta are not well seen due to overlying bowel gas. No peripancreatic fluid collection. Right kidney is unremarkable without hydronephrosis and measures 10 cm. Liver appears normal in echogenicity, contour and size and measures 17 cm. IMPRESSION: Cholelithiasis. Focal area of mild gallbladder wall thickening near the fundus. The technologist reports positive sonographic Yen's sign. Findings raise the question of cholecystitis. Follow-up as clinically warranted.     )          ED Course       Procedures

## 2018-03-10 NOTE — PROGRESS NOTES
Visit with patient in ER prior to transfer to floor. Patient is calm. [place] family present  Assured patient of our support during their time with us.     Mary Haro,  Staff   C: [phone]  /  [email or web address]

## 2018-03-10 NOTE — ED NOTES
TRANSFER - OUT REPORT:    Verbal report given to Pre-op RN(name) on Silvana Acuna  being transferred to Pre-Op(unit) for scheduled procedure. Report consisted of patients Situation, Background, Assessment and   Recommendations(SBAR). Information from the following report(s) ED Summary was reviewed with the receiving nurse. Lines:   Peripheral IV 03/10/18 Left Antecubital (Active)        Opportunity for questions and clarification was provided.       Patient transported with:   pt chart, pt belongings, pt family member, pre-op RN

## 2018-03-10 NOTE — PROGRESS NOTES
Per notes:  Patricejohn Zak [address] 89  Bipolar  No code on file  No directives on file  Today is the first visit by a . Patient asked for prayer. Please follow during admission.     Yelitza Serna, staff [address] [facility]  /   [email or web address]

## 2018-03-10 NOTE — DISCHARGE INSTRUCTIONS
Cholecystectomy: What to Expect at [address] [place] [address]  After your surgery, it is normal to feel weak and tired for several days after you return home. Your belly may be swollen. Since you had laparoscopic surgery, you may also have pain in your shoulder for about 24 hours. You may have gas or need to burp a lot at first, and a few people get diarrhea. The diarrhea usually goes away in 2 to 4 weeks, but it may last longer. For a laparoscopic surgery, most people can go back to work or their normal routine in 1 to 2 weeks, but it may take longer, depending on the type of work you do. This care sheet gives you a general idea about how long it will take for you to recover. However, each person recovers at a different pace. Follow the steps below to get better as quickly as possible. How can you care for yourself at home? Activity  · Rest when you feel tired. Getting enough sleep will help you recover. · Try to walk each day. Start out by walking a little more than you did the day before. Gradually increase the amount you walk. Walking boosts blood flow and helps prevent pneumonia and constipation. · For about 2 to 4 weeks, avoid lifting anything that would make you strain or anything over 10 pounds. · Avoid strenuous activities, such as biking, jogging, weightlifting, and aerobic exercise, until your doctor says it is okay. · You may shower 24 hours after surgery, if your doctor okays it. Pat the cut (incision) dry. Do not take a bath until your doctor tells you it is okay. · You may drive when you are no longer taking pain medicine and can quickly move your foot from the gas pedal to the brake. You must also be able to sit comfortably for a long period of time, even if you do not plan to go far. You might get caught in traffic. · Your doctor will tell you when you can have sex again. Diet  · Eat smaller meals more often instead of fewer larger meals.  You can eat a normal diet, but avoid eating fatty foods for about 1 month. Fatty foods include hamburger, whole milk, cheese, and many snack foods. If your stomach is upset, try bland, low-fat foods like plain rice, broiled chicken, toast, and yogurt. · Drink plenty of fluids (unless your doctor tells you not to). · If you have diarrhea, try avoiding spicy foods, dairy products, fatty foods, and alcohol. You can also watch to see if specific foods cause it, and stop eating them. If the diarrhea continues for more than 2 weeks, talk to your doctor. · You may notice that your bowel movements are not regular right after your surgery. This is common. Try to avoid constipation and straining with bowel movements. You may want to take a fiber supplement every day. If you have not had a bowel movement after a couple of days, ask your doctor about taking a mild laxative. Medicines  · Take pain medicines exactly as directed. ¨ If the doctor gave you a prescription medicine for pain, take it as prescribed. ¨ If you are not taking a prescription pain medicine, take an over-the-counter medicine such as acetaminophen (Tylenol), ibuprofen (Advil, Motrin), or naproxen (Aleve). Read and follow all instructions on the label. ¨ Do not take two or more pain medicines at the same time unless the doctor told you to. Many pain medicines contain acetaminophen, which is Tylenol. Too much Tylenol can be harmful. · If you think your pain medicine is making you sick to your stomach:  ¨ Take your medicine after meals (unless your doctor tells you not to). ¨ Ask your doctor for a different pain medicine. · If your doctor prescribed antibiotics, take them as directed. Do not stop taking them just because you feel better. You need to take the full course of antibiotics. Incision care  · If you have strips of tape or glue on the incision, or cut, leave the tape/glue on for a week or until it falls off. · After 24 hours wash the area daily with warm, soapy water, and pat it dry.   · You may have staples to hold the cut together. Keep them dry until your doctor takes them out. This is usually in 7 to 10 days. · Keep the area clean and dry. You may cover it with a gauze bandage if it weeps or rubs against clothing. Change the bandage every day. Ice   · To reduce swelling and pain, put ice or a cold pack on your belly for 10 to 20 minutes at a time. Do this every 1 to 2 hours. Put a thin cloth between the ice and your skin. Follow-up care is a key part of your treatment and safety. Be sure to make and go to all appointments, and call your doctor if you are having problems. Its also a good idea to know your test results and keep a list of the medicines you take. When should you call for help? Call 911 anytime you think you may need emergency care. For example, call if:  · You passed out (lost consciousness). · You have severe trouble breathing. · You have sudden chest pain and shortness of breath, or you cough up blood. Call your doctor now or seek immediate medical care if:  · You are sick to your stomach and cannot drink fluids. · You have pain that does not get better when you take your pain medicine. · You have signs of infection, such as:  ¨ Increased pain, swelling, warmth, or redness. ¨ Red streaks leading from the incision. ¨ Pus draining from the incision. ¨ Swollen lymph nodes in your neck, armpits, or groin. ¨ A fever. · Your urine turns dark brown or your stool is light-colored or ralph-colored. · Your skin or the whites of your eyes turn yellow. · Bright red blood has soaked through a large bandage over your incision. · You have signs of a blood clot, such as:  ¨ Pain in your calf, back of knee, thigh, or groin. ¨ Redness and swelling in your leg or groin. · You have trouble passing urine or stool, especially if you have mild pain or swelling in your lower belly.   · You had a laparoscopic surgery and your shoulder pain lasts more than 24 hours or if you do not have a bowel movement after taking a laxative. After general anesthesia or intravenous sedation, for 24 hours or while taking prescription Narcotics:  · Limit your activities  · Do not drive and operate hazardous machinery  · Do not make important personal or business decisions  · Do  not drink alcoholic beverages  · If you have not urinated within 8 hours after discharge, please contact your surgeon on call. *  Please give a list of your current medications to your Primary Care Provider. *  Please update this list whenever your medications are discontinued, doses are      changed, or new medications (including over-the-counter products) are added. *  Please carry medication information at all times in case of emergency situations. These are general instructions for a healthy lifestyle:  No smoking/ No tobacco products/ Avoid exposure to second hand smoke  Surgeon General's Warning:  Quitting smoking now greatly reduces serious risk to your health. Obesity, smoking, and sedentary lifestyle greatly increases your risk for illness  A healthy diet, regular physical exercise & weight monitoring are important for maintaining a healthy lifestyle    You may be retaining fluid if you have a history of heart failure or if you experience any of the following symptoms:  Weight gain of 3 pounds or more overnight or 5 pounds in a week, increased swelling in our hands or feet or shortness of breath while lying flat in bed. Please call your doctor as soon as you notice any of these symptoms; do not wait until your next office visit. Recognize signs and symptoms of STROKE:  F-face looks uneven  A-arms unable to move or move unevenly  S-speech slurred or non-existent  T-time-call 911 as soon as signs and symptoms begin-DO NOT go       Back to bed or wait to see if you get better-TIME IS BRAIN. Remove plastic dressing and gauze after 48 hours, leave sterile strips on for 7-10 days.      OK to shower    Low fat diet

## 2018-03-10 NOTE — BRIEF OP NOTE
BRIEF OPERATIVE NOTE    Date of Procedure: 3/10/2018   Preoperative Diagnosis: Cholecystitis  Postoperative Diagnosis: Acute Cholecystitis, cholelithiasis, morbid obesity, fatty liver    Procedure(s):  CHOLECYSTECTOMY LAPAROSCOPIC  Surgeon(s) and Role:     * Yin Alfaro MD - Primary         Assistant Staff: None      Surgical Staff:  Circ-1: Hunter Gracia RN  Scrub Tech-1: Rodriguez Ala  Scrub Tech-2: Luther Jacobs  Event Time In   Incision Start  9:30 AM   Incision Close 10:47 AM     Anesthesia: General   Estimated Blood Loss: 10 ml  Specimens:   ID Type Source Tests Collected by Time Destination   1 : gallbladder Preservative Gallbladder  Yin Alfaro MD 3/10/2018  9:41 AM Pathology      Findings: as post op, the gallbladder is very thickened and edematous with a giant stone  Complications: none  Implants: * No implants in log *

## 2018-03-20 PROBLEM — Z90.49 S/P LAPAROSCOPIC CHOLECYSTECTOMY: Status: ACTIVE | Noted: 2018-03-20

## 2018-03-20 PROBLEM — K76.0 FATTY LIVER: Status: ACTIVE | Noted: 2018-03-20

## 2018-07-30 LAB
BACTERIA URNS QL MICRO: ABNORMAL /HPF
CASTS URNS QL MICRO: ABNORMAL /LPF
EPI CELLS #/AREA URNS HPF: ABNORMAL /HPF
HCG UR QL: NEGATIVE
RBC #/AREA URNS HPF: ABNORMAL /HPF
WBC URNS QL MICRO: >100 /HPF

## 2018-07-30 PROCEDURE — 81025 URINE PREGNANCY TEST: CPT

## 2018-07-30 PROCEDURE — 51798 US URINE CAPACITY MEASURE: CPT | Performed by: EMERGENCY MEDICINE

## 2018-07-30 PROCEDURE — 81015 MICROSCOPIC EXAM OF URINE: CPT | Performed by: EMERGENCY MEDICINE

## 2018-07-30 PROCEDURE — 99284 EMERGENCY DEPT VISIT MOD MDM: CPT | Performed by: EMERGENCY MEDICINE

## 2018-07-30 PROCEDURE — 81003 URINALYSIS AUTO W/O SCOPE: CPT | Performed by: EMERGENCY MEDICINE

## 2018-07-31 ENCOUNTER — HOSPITAL ENCOUNTER (EMERGENCY)
Age: 33
Discharge: HOME | End: 2018-07-31
Attending: EMERGENCY MEDICINE
Payer: COMMERCIAL

## 2018-07-31 VITALS
OXYGEN SATURATION: 95 % | BODY MASS INDEX: 44.12 KG/M2 | DIASTOLIC BLOOD PRESSURE: 72 MMHG | HEIGHT: 63 IN | TEMPERATURE: 99.5 F | WEIGHT: 249 LBS | RESPIRATION RATE: 18 BRPM | HEART RATE: 80 BPM | SYSTOLIC BLOOD PRESSURE: 127 MMHG

## 2018-07-31 DIAGNOSIS — N10 ACUTE PYELONEPHRITIS: Primary | ICD-10-CM

## 2018-07-31 PROCEDURE — 74011250636 HC RX REV CODE- 250/636: Performed by: EMERGENCY MEDICINE

## 2018-07-31 PROCEDURE — 96372 THER/PROPH/DIAG INJ SC/IM: CPT | Performed by: EMERGENCY MEDICINE

## 2018-07-31 PROCEDURE — 74011000250 HC RX REV CODE- 250: Performed by: EMERGENCY MEDICINE

## 2018-07-31 PROCEDURE — 51798 US URINE CAPACITY MEASURE: CPT | Performed by: EMERGENCY MEDICINE

## 2018-07-31 PROCEDURE — 74011250637 HC RX REV CODE- 250/637: Performed by: EMERGENCY MEDICINE

## 2018-07-31 RX ORDER — PHENAZOPYRIDINE HYDROCHLORIDE 200 MG/1
200 TABLET, FILM COATED ORAL 3 TIMES DAILY
Qty: 6 TAB | Refills: 0 | Status: SHIPPED | OUTPATIENT
Start: 2018-07-31 | End: 2018-08-02

## 2018-07-31 RX ORDER — ONDANSETRON 4 MG/1
4 TABLET, ORALLY DISINTEGRATING ORAL
Status: COMPLETED | OUTPATIENT
Start: 2018-07-31 | End: 2018-07-31

## 2018-07-31 RX ORDER — PHENAZOPYRIDINE HYDROCHLORIDE 95 MG/1
200 TABLET ORAL
Status: COMPLETED | OUTPATIENT
Start: 2018-07-31 | End: 2018-07-31

## 2018-07-31 RX ORDER — PROMETHAZINE HYDROCHLORIDE 25 MG/1
25 TABLET ORAL
Qty: 12 TAB | Refills: 0 | Status: SHIPPED | OUTPATIENT
Start: 2018-07-31 | End: 2018-12-17 | Stop reason: ALTCHOICE

## 2018-07-31 RX ORDER — HYDROCODONE BITARTRATE AND ACETAMINOPHEN 5; 325 MG/1; MG/1
1 TABLET ORAL
Qty: 8 TAB | Refills: 0 | Status: SHIPPED | OUTPATIENT
Start: 2018-07-31 | End: 2018-11-29 | Stop reason: ALTCHOICE

## 2018-07-31 RX ORDER — CIPROFLOXACIN 500 MG/1
500 TABLET ORAL 2 TIMES DAILY
Qty: 20 TAB | Refills: 0 | Status: SHIPPED | OUTPATIENT
Start: 2018-07-31 | End: 2018-11-29 | Stop reason: ALTCHOICE

## 2018-07-31 RX ORDER — HYDROCODONE BITARTRATE AND ACETAMINOPHEN 7.5; 325 MG/1; MG/1
1 TABLET ORAL
Status: COMPLETED | OUTPATIENT
Start: 2018-07-31 | End: 2018-07-31

## 2018-07-31 RX ADMIN — ONDANSETRON 4 MG: 4 TABLET, ORALLY DISINTEGRATING ORAL at 00:54

## 2018-07-31 RX ADMIN — HYDROCODONE BITARTRATE AND ACETAMINOPHEN 1 TABLET: 7.5; 325 TABLET ORAL at 00:57

## 2018-07-31 RX ADMIN — LIDOCAINE HYDROCHLORIDE 500 MG: 10 INJECTION, SOLUTION INFILTRATION; PERINEURAL at 00:56

## 2018-07-31 RX ADMIN — URINARY PAIN RELIEF 190 MG: 95 TABLET ORAL at 00:58

## 2018-07-31 NOTE — DISCHARGE INSTRUCTIONS
Respiratory plenty of fluids. Tylenol if needed for fever. Take only one 500 mg Tylenol if you are also taking a pain pill. Recheck with your doctor 2 weeks for repeat urine test.  Recheck sooner for worse pain, high fever, vomiting. Kidney Infection: Care Instructions  Your Care Instructions    A kidney infection (pyelonephritis) is a type of urinary tract infection, or UTI. Most UTIs are bladder infections. Kidney infections tend to make people much sicker than bladder infections do. A kidney infection is also more serious because it can cause lasting damage if it is not treated quickly. Follow-up care is a key part of your treatment and safety. Be sure to make and go to all appointments, and call your doctor if you are having problems. It's also a good idea to know your test results and keep a list of the medicines you take. How can you care for yourself at home? · Take your antibiotics as directed. Do not stop taking them just because you feel better. You need to take the full course of antibiotics. · Drink plenty of water, enough so that your urine is light yellow or clear like water. This may help wash out bacteria that are causing the infection. If you have kidney, heart, or liver disease and have to limit fluids, talk with your doctor before you increase the amount of fluids you drink. · Urinate often. Try to empty your bladder each time. · To relieve pain, take a hot shower or lay a heating pad (set on low) over your lower belly. Never go to sleep with a heating pad in place. Put a thin cloth between the heating pad and your skin. To help prevent kidney infections  · Drink plenty of water each day. This helps you urinate often, which clears bacteria from your system. If you have kidney, heart, or liver disease and have to limit fluids, talk with your doctor before you increase the amount of fluids you drink. · Urinate when you have the urge. Do not hold your urine for a long time. Urinate before you go to sleep. · If you have symptoms of a bladder infection, such as burning when you urinate or having to urinate often, call your doctor so you can treat the problem before it gets worse. If you do not treat a bladder infection quickly, it can spread to the kidney. · Men should keep the tip of the penis clean. If you are a woman, keep these ideas in mind:  · Urinate right after you have sex. · Change sanitary pads often. Avoid douches, feminine hygiene sprays, and other feminine hygiene products that have deodorants. · After going to the bathroom, wipe from front to back. When should you call for help? Call your doctor now or seek immediate medical care if:    · You have symptoms that a kidney infection is getting worse. These may include:  ¨ Pain or burning when you urinate. ¨ A frequent need to urinate without being able to pass much urine. ¨ Pain in the flank, which is just below the rib cage and above the waist on either side of the back. ¨ Blood in the urine. ¨ A fever.     · You are vomiting or nauseated.    Watch closely for changes in your health, and be sure to contact your doctor if:    · You do not get better as expected. Where can you learn more? Go to http://tiffany-delio.info/. Enter W143 in the search box to learn more about \"Kidney Infection: Care Instructions. \"  Current as of: May 12, 2017  Content Version: 11.7  © 8239-0387 [employer]. Care instructions adapted under license by [employer] (which disclaims liability or warranty for this information). If you have questions about a medical condition or this instruction, always ask your healthcare professional. [place] [address] any warranty or liability for your use of this information.

## 2018-07-31 NOTE — ED PROVIDER NOTES
HPI Comments: 79-year-old female has a history of cholecystectomy, appendectomy, tubal ligation. She's had 3 day history of dysuria which is now moved to her flanks associated with some minimal subjective fever. Still has suprapubic pressure urgency frequency and dysuria. No vomiting. She has had slight diarrhea. No history of recurrent UTIs. Patient is a 29 y.o. female presenting with urinary pain. The history is provided by the patient. Urinary Pain This is a new problem. The current episode started more than 2 days ago. The problem occurs every urination. The problem has not changed since onset. The quality of the pain is described as burning. The pain is moderate. There has been no fever. Associated symptoms include chills, nausea, frequency, hesitancy, urgency, flank pain, abdominal pain and back pain. Pertinent negatives include no vomiting, no hematuria and no vaginal discharge. The patient is not pregnant. She has tried nothing for the symptoms. Her past medical history does not include kidney stones or recurrent UTIs. Past Medical History:  
Diagnosis Date  Bipolar 1 disorder ([facility] [address]  GERD (gastroesophageal reflux disease)  Hypothyroidism  Morbid obesity ([facility] [address]  Poor dentition  PTSD (post-traumatic stress disorder) Due to traumatic death of family member Past Surgical History:  
Procedure Laterality Date  HX APPENDECTOMY  HX TUBAL LIGATION  12/2016  HX WISDOM TEETH EXTRACTION Family History:  
Problem Relation Age of Onset  Thyroid Disease Father  High Cholesterol Father  Diabetes Mother  Hypertension Mother  Other Mother   
  diverticular disease Social History Social History  Marital status: SINGLE Spouse name: N/A  
 Number of children: 1  Years of education: N/A Occupational History [employer] Social History Main Topics  Smoking status: Former Smoker   Quit date: 12/18/2009  Smokeless tobacco: Never Used  Alcohol use 0.0 oz/week  
  0 Standard drinks or equivalent per week Comment: occassional - 3-4 times per year  Drug use: Yes Special: Marijuana  Sexual activity: Not on file Other Topics Concern  Not on file Social History Narrative Has a steady boyfriend. Has 2 daughters. 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 ALLERGIES: Lexapro [escitalopram] Review of Systems Constitutional: Positive for chills. Negative for fever. Respiratory: Negative for cough and shortness of breath. Cardiovascular: Negative for chest pain and palpitations. Gastrointestinal: Positive for abdominal pain and nausea. Negative for diarrhea and vomiting. Genitourinary: Positive for flank pain, frequency, hesitancy and urgency. Negative for dysuria, hematuria and vaginal discharge. Musculoskeletal: Positive for back pain. Negative for neck pain. Skin: Negative for color change and rash. All other systems reviewed and are negative. Vitals:  
 07/30/18 2232 BP: 122/78 Pulse: 90 Resp: 16 Temp: 99.5 °F (37.5 °C) SpO2: 95% Weight: 112.9 kg (249 lb) Height: 5' 3\" (1.6 m) Physical Exam  
Constitutional: She is oriented to person, place, and time. She appears well-developed and well-nourished. No distress. HENT:  
Head: Normocephalic and atraumatic. Right Ear: External ear normal.  
Left Ear: External ear normal.  
Mouth/Throat: Oropharynx is clear and moist. No oropharyngeal exudate. Eyes: Conjunctivae and EOM are normal. Pupils are equal, round, and reactive to light. Neck: Normal range of motion. Neck supple. Cardiovascular: Normal rate, regular rhythm and intact distal pulses. No murmur heard. Pulmonary/Chest: Breath sounds normal. No respiratory distress. Abdominal: Soft. Bowel sounds are normal. She exhibits no mass. There is tenderness in the suprapubic area.  There is no rebound, no guarding, no CVA tenderness, no tenderness at McBurney's point and negative Yen's sign. No hernia. Neurological: She is alert and oriented to person, place, and time. Gait normal.  
Nl speech Skin: Skin is warm and dry. Psychiatric: She has a normal mood and affect. Her speech is normal.  
Nursing note and vitals reviewed. MDM Number of Diagnoses or Management Options Diagnosis management comments: Suspected UTI and pyelonephritis. No evidence for acute abdomen Amount and/or Complexity of Data Reviewed Clinical lab tests: ordered and reviewed Risk of Complications, Morbidity, and/or Mortality Presenting problems: moderate Diagnostic procedures: minimal 
Management options: low Patient Progress Patient progress: stable ED Course Procedures Results Include: 
 
Recent Results (from the past 24 hour(s)) HCG URINE, QL. - POC Collection Time: 07/30/18 10:46 PM  
Result Value Ref Range Pregnancy test,urine (POC) NEGATIVE  NEG    
URINE MICROSCOPIC Collection Time: 07/30/18 10:48 PM  
Result Value Ref Range WBC >100 (H) 0 /hpf  
 RBC 20-50 0 /hpf Epithelial cells 3-5 0 /hpf Bacteria 1+ (H) 0 /hpf Casts 3-5 0 /lpf

## 2018-07-31 NOTE — ED NOTES
I have reviewed discharge instructions with the patient. The patient verbalized understanding. Patient left ED via Discharge Method: ambulatory to Home with (male visitor). Opportunity for questions and clarification provided. Patient given 3 scripts. To continue your aftercare when you leave the hospital, you may receive an automated call from our care team to check in on how you are doing. This is a free service and part of our promise to provide the best care and service to meet your aftercare needs.  If you have questions, or wish to unsubscribe from this service please call [phone]. Thank you for Choosing our [facility] Emergency Department.

## 2019-05-23 PROBLEM — E06.3 HASHIMOTO'S THYROIDITIS: Status: ACTIVE | Noted: 2019-05-23

## 2021-01-04 ENCOUNTER — HOSPITAL ENCOUNTER (OUTPATIENT)
Dept: CT IMAGING | Age: 36
Discharge: HOME | End: 2021-01-04
Attending: FAMILY MEDICINE
Payer: COMMERCIAL

## 2021-01-04 DIAGNOSIS — K92.1 HEMATOCHEZIA: ICD-10-CM

## 2021-01-04 DIAGNOSIS — E78.2 MIXED HYPERLIPIDEMIA: ICD-10-CM

## 2021-01-04 DIAGNOSIS — G44.52 NEW DAILY PERSISTENT HEADACHE: ICD-10-CM

## 2021-01-04 DIAGNOSIS — K62.5 RECTAL BLEEDING: ICD-10-CM

## 2021-01-04 DIAGNOSIS — E03.9 PRIMARY HYPOTHYROIDISM: ICD-10-CM

## 2021-01-04 DIAGNOSIS — K21.9 GASTROESOPHAGEAL REFLUX DISEASE, UNSPECIFIED WHETHER ESOPHAGITIS PRESENT: ICD-10-CM

## 2021-01-04 DIAGNOSIS — K62.89 RECTAL PAIN: ICD-10-CM

## 2021-01-04 PROCEDURE — 70450 CT HEAD/BRAIN W/O DYE: CPT

## 2021-01-05 NOTE — PROGRESS NOTES
Please let her know that her CT of the head without contrast is negative per radiologist; awaiting lab work; Diana Holloway, please document that the patient had a negative urine pregnancy yesterday and I will sign off on that lab; thank you, Dr. Florez Patient

## 2021-02-15 ENCOUNTER — APPOINTMENT (OUTPATIENT)
Dept: GENERAL RADIOLOGY | Age: 36
End: 2021-02-15
Attending: EMERGENCY MEDICINE
Payer: COMMERCIAL

## 2021-02-15 ENCOUNTER — HOSPITAL ENCOUNTER (EMERGENCY)
Age: 36
Discharge: HOME | End: 2021-02-15
Attending: EMERGENCY MEDICINE
Payer: COMMERCIAL

## 2021-02-15 VITALS
BODY MASS INDEX: 41.64 KG/M2 | HEIGHT: 63 IN | SYSTOLIC BLOOD PRESSURE: 124 MMHG | DIASTOLIC BLOOD PRESSURE: 81 MMHG | RESPIRATION RATE: 18 BRPM | HEART RATE: 95 BPM | OXYGEN SATURATION: 100 % | WEIGHT: 235 LBS | TEMPERATURE: 98.7 F

## 2021-02-15 DIAGNOSIS — Z20.822 PERSON UNDER INVESTIGATION FOR COVID-19: Primary | ICD-10-CM

## 2021-02-15 LAB — SARS-COV-2, COV2: NORMAL

## 2021-02-15 PROCEDURE — 71045 X-RAY EXAM CHEST 1 VIEW: CPT

## 2021-02-15 PROCEDURE — 74011250637 HC RX REV CODE- 250/637: Performed by: EMERGENCY MEDICINE

## 2021-02-15 PROCEDURE — 99283 EMERGENCY DEPT VISIT LOW MDM: CPT

## 2021-02-15 PROCEDURE — U0004 COV-19 TEST NON-CDC HGH THRU: HCPCS

## 2021-02-15 RX ORDER — DEXAMETHASONE SODIUM PHOSPHATE 100 MG/10ML
10 INJECTION INTRAMUSCULAR; INTRAVENOUS
Status: COMPLETED | OUTPATIENT
Start: 2021-02-15 | End: 2021-02-15

## 2021-02-15 RX ORDER — ALBUTEROL SULFATE 90 UG/1
2 AEROSOL, METERED RESPIRATORY (INHALATION)
Qty: 1 INHALER | Refills: 2 | Status: SHIPPED | OUTPATIENT
Start: 2021-02-15

## 2021-02-15 RX ORDER — MONTELUKAST SODIUM 10 MG/1
10 TABLET ORAL DAILY
Qty: 30 TAB | Refills: 0 | Status: SHIPPED | OUTPATIENT
Start: 2021-02-15 | End: 2021-05-12

## 2021-02-15 RX ORDER — BENZONATATE 100 MG/1
100 CAPSULE ORAL
Qty: 30 CAP | Refills: 0 | Status: SHIPPED | OUTPATIENT
Start: 2021-02-15 | End: 2021-02-22

## 2021-02-15 RX ADMIN — DEXAMETHASONE SODIUM PHOSPHATE 10 MG: 10 INJECTION INTRAMUSCULAR; INTRAVENOUS at 21:53

## 2021-02-15 NOTE — ED TRIAGE NOTES
Pt complains of anosmia malaise loss of appetite and sore throat. States both of her children were recently positive for covid. Most recent positive on Thursday.

## 2021-02-15 NOTE — Clinical Note
[facility] EMERGENCY DEPT 
[address] [facility] [address] [address] 
[phone] Work/School Note Date: 2/15/2021 To Whom It May concern: 
 
Silvana Acuna was evaulated by the following provider(s): 
Attending Provider: Radha Kemp virus is suspected. Per the CDC guidelines we recommend home isolation until the following conditions are all met: 1. At least 10 days have passed since symptoms first appeared and 2. At least 24 hours have passed since last fever without the use of fever-reducing medications and 
3. Symptoms (e.g., cough, shortness of breath) have improved Sincerely, Rina Galvez, DO

## 2021-02-16 LAB — SARS COV-2, XPGCVT: NEGATIVE

## 2021-02-16 NOTE — DISCHARGE INSTRUCTIONS
Take medications as needed. Please follow the quarantine guidelines regarding COVID-19. Advance Care Planning  People with COVID-19 may have no symptoms, mild symptoms, such as fever, cough, and shortness of breath or they may have more severe illness, developing severe and fatal pneumonia. As a result, Advance Care Planning with attention to naming a health care decision maker (someone you trust to make healthcare decisions for you if you could not speak for yourself) and sharing other health care preferences is important BEFORE a possible health crisis. Please contact your Primary Care Provider to discuss Advance Care Planning. Preventing the Spread of Coronavirus Disease 2019 in Homes and Residential Communities  For the most recent information go to [employer].    Prevention steps for People with confirmed or suspected COVID-19 (including persons under investigation) who do not need to be hospitalized  and   People with confirmed COVID-19 who were hospitalized and determined to be medically stable to go home    Your healthcare provider and public health staff will evaluate whether you can be cared for at home. If it is determined that you do not need to be hospitalized and can be isolated at home, you will be monitored by staff from your local or state health department. You should follow the prevention steps below until a healthcare provider or local or state health department says you can return to your normal activities. Stay home except to get medical care  People who are mildly ill with COVID-19 are able to isolate at home during their illness. You should restrict activities outside your home, except for getting medical care. Do not go to work, school, or public areas. Avoid using public transportation, ride-sharing, or taxis.   Separate yourself from other people and animals in your home  People: As much as possible, you should stay in a specific room and away from other people in your home. Also, you should use a separate bathroom, if available. Animals: You should restrict contact with pets and other animals while you are sick with COVID-19, just like you would around other people. Although there have not been reports of pets or other animals becoming sick with COVID-19, it is still recommended that people sick with COVID-19 limit contact with animals until more information is known about the virus. When possible, have another member of your household care for your animals while you are sick. If you are sick with COVID-19, avoid contact with your pet, including petting, snuggling, being kissed or licked, and sharing food. If you must care for your pet or be around animals while you are sick, wash your hands before and after you interact with pets and wear a facemask. Call ahead before visiting your doctor  If you have a medical appointment, call the healthcare provider and tell them that you have or may have COVID-19. This will help the healthcare providers office take steps to keep other people from getting infected or exposed. Wear a facemask  You should wear a facemask when you are around other people (e.g., sharing a room or vehicle) or pets and before you enter a healthcare providers office. If you are not able to wear a facemask (for example, because it causes trouble breathing), then people who live with you should not stay in the same room with you, or they should wear a facemask if they enter your room. Cover your coughs and sneezes  Cover your mouth and nose with a tissue when you cough or sneeze. Throw used tissues in a lined trash can. Immediately wash your hands with soap and water for at least 20 seconds or, if soap and water are not available, clean your hands with an alcohol-based hand  that contains at least 60% alcohol.   Clean your hands often  Wash your hands often with soap and water for at least 20 seconds, especially after blowing your nose, coughing, or sneezing; going to the bathroom; and before eating or preparing food. If soap and water are not readily available, use an alcohol-based hand  with at least 60% alcohol, covering all surfaces of your hands and rubbing them together until they feel dry. Soap and water are the best option if hands are visibly dirty. Avoid touching your eyes, nose, and mouth with unwashed hands. Avoid sharing personal household items  You should not share dishes, drinking glasses, cups, eating utensils, towels, or bedding with other people or pets in your home. After using these items, they should be washed thoroughly with soap and water. Clean all high-touch surfaces everyday  High touch surfaces include counters, tabletops, doorknobs, bathroom fixtures, toilets, phones, keyboards, tablets, and bedside tables. Also, clean any surfaces that may have blood, stool, or body fluids on them. Use a household cleaning spray or wipe, according to the label instructions. Labels contain instructions for safe and effective use of the cleaning product including precautions you should take when applying the product, such as wearing gloves and making sure you have good ventilation during use of the product. Monitor your symptoms  Seek prompt medical attention if your illness is worsening (e.g., difficulty breathing). Before seeking care, call your healthcare provider and tell them that you have, or are being evaluated for, COVID-19. Put on a facemask before you enter the facility. These steps will help the healthcare providers office to keep other people in the office or waiting room from getting infected or exposed. Ask your healthcare provider to call the local or [facility] health department.  Persons who are placed under active monitoring or facilitated self-monitoring should follow instructions provided by their local health department or occupational health professionals, as appropriate. When working with your local health department check their available hours. If you have a medical emergency and need to call 911, notify the dispatch personnel that you have, or are being evaluated for COVID-19. If possible, put on a facemask before emergency medical services arrive. Discontinuing home isolation  Patients with confirmed COVID-19 should remain under home isolation precautions until the risk of secondary transmission to others is thought to be low. The decision to discontinue home isolation precautions should be made on a case-by-case basis, in consultation with healthcare providers and state and local health departments.

## 2021-02-16 NOTE — ED NOTES
I have reviewed discharge instructions with the patient. The patient verbalized understanding. Patient left ED via Discharge Method: ambulatory to Home with self. Opportunity for questions and clarification provided. Patient given 2 scripts. To continue your aftercare when you leave the hospital, you may receive an automated call from our care team to check in on how you are doing. This is a free service and part of our promise to provide the best care and service to meet your aftercare needs.  If you have questions, or wish to unsubscribe from this service please call [phone]. Thank you for Choosing our [facility] Emergency Department.

## 2021-02-16 NOTE — ED PROVIDER NOTES
The patient is a 81-year-old female presenting emerged from day complaining of loss of smell and body aches since Thursday. The patient states that all of her children and parents have all had Covid over the last month. Her youngest daughter was most recently diagnosed with it week ago Thursday. The patient said that so far she had remained asymptomatic up until 4 days ago. The patient has some coughing which ended up with some spiculations of blood and that is why she decided to come to the emergency department.            Past Medical History:   Diagnosis Date    Anxiety     Bipolar 1 disorder ([facility])     Endometriosis     GERD (gastroesophageal reflux disease)     Hypothyroidism     Morbid obesity ([facility])     Poor dentition     PTSD (post-traumatic stress disorder)     Due to traumatic death of family member       Past Surgical History:   Procedure Laterality Date    HX APPENDECTOMY  2013    HX LAP CHOLECYSTECTOMY  03/2018    HX TUBAL LIGATION  12/2016    HX WISDOM TEETH EXTRACTION  2005         Family History:   Problem Relation Age of Onset    Thyroid Disease Father     High Cholesterol Father     Heart Surgery Father     Heart Disease Father     Arthritis-rheumatoid Father     Diabetes Mother         type 2    Hypertension Mother     Other Mother         diverticular disease    No Known Problems Brother     Breast Cancer Maternal Grandmother     Cancer Maternal Grandfather         kidney    Heart Disease Paternal Grandmother     Arthritis-rheumatoid Paternal Grandmother     Hypertension Paternal Grandmother     Heart Disease Paternal Grandfather        Social History     Socioeconomic History    Marital status: LIFE PARTNER     Spouse name: Not on file    Number of children: 1    Years of education: Not on file    Highest education level: Not on file   Occupational History    Occupation: Pharmacy Tech Graduate   Social Needs    Financial resource strain: Not on file   Renetta-Tyler insecurity     Worry: Not on file     Inability: Not on file    Transportation needs     Medical: Not on file     Non-medical: Not on file   Tobacco Use    Smoking status: Former Smoker     Packs/day: 0.50     Years: 4.00     Pack years: 2.00     Quit date: 2009     Years since quittin.1    Smokeless tobacco: Never Used   Substance and Sexual Activity    Alcohol use: Not Currently     Alcohol/week: 0.0 standard drinks     Comment: occassional - 3-4 times per year    Drug use: Not Currently     Types: Marijuana    Sexual activity: Yes     Partners: Male     Birth control/protection: Surgical     Comment: tubal ligation   Lifestyle    Physical activity     Days per week: Not on file     Minutes per session: Not on file    Stress: Not on file   Relationships    Social connections     Talks on phone: Not on file     Gets together: Not on file     Attends Bahai service: Not on file     Active member of club or organization: Not on file     Attends meetings of clubs or organizations: Not on file     Relationship status: Not on file    Intimate partner violence     Fear of current or ex partner: Not on file     Emotionally abused: Not on file     Physically abused: Not on file     Forced sexual activity: Not on file   Other Topics Concern     Service Not Asked    Blood Transfusions Not Asked    Caffeine Concern Yes    Occupational Exposure Not Asked   Augustina [place] Hazards Not Asked    Sleep Concern Not Asked    Stress Concern Not Asked    Weight Concern Not Asked    Special Diet Not Asked    Back Care Not Asked    Exercise Yes    Bike Helmet Not Asked    Seat Belt Yes    Self-Exams Yes   Social History Narrative    Has a steady boyfriend. Has 2 daughters. 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 Not working yet! ALLERGIES: Adhesive and Lexapro [escitalopram]    Review of Systems   Constitutional: Positive for appetite change and chills.    Respiratory: Positive for cough and shortness of breath. Cardiovascular: Negative. Gastrointestinal: Negative. Genitourinary: Negative. Musculoskeletal: Positive for myalgias. Skin: Negative. Neurological: Negative. Hematological: Negative. Vitals:    02/15/21 1713   BP: 124/81   Pulse: 95   Resp: 18   Temp: 98.7 °F (37.1 °C)   SpO2: 100%   Weight: 106.6 kg (235 lb)   Height: 5' 3\" (1.6 m)            Physical Exam     GENERAL:The patient has Body mass index is 41.63 kg/m². Well-hydrated. No acute distress. VITAL SIGNS: Heart rate, blood pressure, respiratory rate reviewed as recorded in nurse's notes  EYES: Pupils reactive. Extraocular motion intact. No conjunctival redness or drainage. LUNGS: Breath sounds clear and equal bilaterally no accessory muscle use  CHEST: No deformity  CARDIOVASCULAR: Regular rate and rhythm  ABDOMEN: Soft without tenderness. No palpable masses or organomegaly. No  peritoneal signs. No rigidity. EXTREMITIES: No clubbing or cyanosis. No joint swelling. Normal muscle tone. No restricted range of motion appreciated. NEUROLOGIC: Sensation is grossly intact. Cranial nerve exam reveals face is  symmetrical, tongue is midline speech is clear. SKIN: No rash or petechiae. Good skin turgor palpated. PSYCHIATRIC: Alert and oriented. Appropriate behavior and judgment.       MDM  Number of Diagnoses or Management Options  Diagnosis management comments: Viral infection, croup (bronchiolitis), mononucleosis, COVID-19    Acute sinusitis, chronic sinusitis,    OM, serous OM, otitis externa,    Pharyngitis, Strep Throat, adenitis, uvulitis, rhinitis, postnasal drainage, nasal congestion       Amount and/or Complexity of Data Reviewed  Clinical lab tests: ordered and reviewed  Tests in the radiology section of CPT®: ordered and reviewed  Tests in the medicine section of CPT®: reviewed and ordered  Decide to obtain previous medical records or to obtain history from someone other than the patient: yes  Independent visualization of images, tracings, or specimens: yes      ED Course as of Feb 15 2154   Mon Feb 15, 2021   2153 During patient's visit to the emergency department in addition to providing emergency care I conducted a COVID-19 counseling visit. She is aware that he needs immediate isolation away from others and that she is to inform any close contacts about her condition. I have also reviewed the signs and symptoms of COVID-19 including the potential for progression of the disease including return precautions and an outpatient plan. We have discussed ways to be successful in the quarantine process and provided instructions about when it is safe to stop strict isolation.   She has been informed that the public health department should be contacting her.          [KH]      ED Course User Index  [KH] Elder Golden, DO       Procedures

## 2021-05-24 ENCOUNTER — HOSPITAL ENCOUNTER (OUTPATIENT)
Dept: ULTRASOUND IMAGING | Age: 36
Discharge: HOME | End: 2021-05-24
Attending: UROLOGY
Payer: COMMERCIAL

## 2021-05-24 DIAGNOSIS — N39.0 URINARY TRACT INFECTION WITHOUT HEMATURIA, SITE UNSPECIFIED: ICD-10-CM

## 2021-05-24 PROCEDURE — 76770 US EXAM ABDO BACK WALL COMP: CPT

## 2021-05-26 ENCOUNTER — HOSPITAL ENCOUNTER (OUTPATIENT)
Dept: MRI IMAGING | Age: 36
Discharge: HOME | End: 2021-05-26
Attending: PSYCHIATRY & NEUROLOGY
Payer: COMMERCIAL

## 2021-05-26 DIAGNOSIS — G37.9 DEMYELINATING DISEASE (HCC): ICD-10-CM

## 2021-05-26 PROCEDURE — 74011636320 HC RX REV CODE- 636/320: Performed by: PSYCHIATRY & NEUROLOGY

## 2021-05-26 PROCEDURE — 70553 MRI BRAIN STEM W/O & W/DYE: CPT

## 2021-05-26 PROCEDURE — A9576 INJ PROHANCE MULTIPACK: HCPCS | Performed by: PSYCHIATRY & NEUROLOGY

## 2021-05-26 RX ORDER — SODIUM CHLORIDE 0.9 % (FLUSH) 0.9 %
10 SYRINGE (ML) INJECTION
Status: COMPLETED | OUTPATIENT
Start: 2021-05-26 | End: 2021-05-26

## 2021-05-26 RX ADMIN — Medication 10 ML: at 18:23

## 2021-05-26 RX ADMIN — GADOTERIDOL 22 ML: 279.3 INJECTION, SOLUTION INTRAVENOUS at 18:23

## 2021-11-15 PROBLEM — K62.5 BRBPR (BRIGHT RED BLOOD PER RECTUM): Status: ACTIVE | Noted: 2021-04-07

## 2021-11-15 PROBLEM — F60.3: Status: ACTIVE | Noted: 2021-11-15

## 2021-11-15 PROBLEM — K21.9 GASTROESOPHAGEAL REFLUX DISEASE WITHOUT ESOPHAGITIS: Status: ACTIVE | Noted: 2019-08-01

## 2021-11-15 PROBLEM — K64.8 OTHER HEMORRHOIDS: Status: ACTIVE | Noted: 2021-04-07

## 2021-11-15 PROBLEM — R19.7 DIARRHEA OF PRESUMED INFECTIOUS ORIGIN: Status: ACTIVE | Noted: 2019-08-01

## 2021-11-15 PROBLEM — N80.03 ADENOMYOSIS OF UTERUS: Status: ACTIVE | Noted: 2020-07-08

## 2021-11-15 PROBLEM — R19.7 DIARRHEA: Status: ACTIVE | Noted: 2019-08-01

## 2022-02-10 ENCOUNTER — HOSPITAL ENCOUNTER (OUTPATIENT)
Dept: SLEEP MEDICINE | Age: 37
Discharge: HOME | End: 2022-02-10
Payer: COMMERCIAL

## 2022-02-10 PROCEDURE — 95810 POLYSOM 6/> YRS 4/> PARAM: CPT

## 2022-03-18 PROBLEM — K64.8 OTHER HEMORRHOIDS: Status: ACTIVE | Noted: 2021-04-07

## 2022-03-18 PROBLEM — K21.9 GASTROESOPHAGEAL REFLUX DISEASE WITHOUT ESOPHAGITIS: Status: ACTIVE | Noted: 2019-08-01

## 2022-03-19 PROBLEM — F60.3: Status: ACTIVE | Noted: 2021-11-15

## 2022-03-19 PROBLEM — R19.7 DIARRHEA OF PRESUMED INFECTIOUS ORIGIN: Status: ACTIVE | Noted: 2019-08-01

## 2022-03-19 PROBLEM — E06.3 HASHIMOTO'S THYROIDITIS: Status: ACTIVE | Noted: 2019-05-23

## 2022-03-19 PROBLEM — K62.5 BRBPR (BRIGHT RED BLOOD PER RECTUM): Status: ACTIVE | Noted: 2021-04-07

## 2022-03-19 PROBLEM — R19.7 DIARRHEA: Status: ACTIVE | Noted: 2019-08-01

## 2022-03-19 PROBLEM — K76.0 FATTY LIVER: Status: ACTIVE | Noted: 2018-03-20

## 2022-03-20 PROBLEM — N80.03 ADENOMYOSIS OF UTERUS: Status: ACTIVE | Noted: 2020-07-08

## 2022-05-16 PROBLEM — R39.15 URINARY URGENCY: Status: ACTIVE | Noted: 2022-05-16

## 2022-05-16 PROBLEM — N39.0 RECURRENT UTI: Status: ACTIVE | Noted: 2022-05-16

## 2022-05-16 PROBLEM — R35.0 URINARY FREQUENCY: Status: ACTIVE | Noted: 2022-05-16

## 2022-05-16 PROBLEM — G43.709 CHRONIC MIGRAINE WITHOUT AURA WITHOUT STATUS MIGRAINOSUS, NOT INTRACTABLE: Status: ACTIVE | Noted: 2022-05-16

## 2022-05-31 RX ORDER — SOLIFENACIN SUCCINATE 5 MG/1
TABLET, FILM COATED ORAL
Qty: 30 TABLET | Refills: 2 | Status: SHIPPED | OUTPATIENT
Start: 2022-05-31

## 2022-06-01 RX ORDER — LEVOTHYROXINE SODIUM 0.15 MG/1
150 TABLET ORAL
Qty: 30 TABLET | Refills: 0 | Status: SHIPPED | OUTPATIENT
Start: 2022-06-01 | End: 2022-06-28

## 2022-06-16 ENCOUNTER — NURSE ONLY (OUTPATIENT)
Dept: FAMILY MEDICINE CLINIC | Facility: CLINIC | Age: 37
End: 2022-06-16

## 2022-06-21 ASSESSMENT — LIFESTYLE VARIABLES
PAST THREE MONTHS WHAT IS THE LARGEST AMOUNT OF ALCOHOLIC DRINKS YOU HAVE CONSUMED IN ONE DAY: 0
ALCOHOL_DAYS_PER_WEEK: 0
HAVE YOU EVER RECEIVED ALCOHOL OR OTHER DRUG ABUSE TREATMENT: YES
HISTORY_ALCOHOL_USE: NO

## 2022-06-21 ASSESSMENT — ANXIETY QUESTIONNAIRES
IF YOU CHECKED OFF ANY PROBLEMS ON THIS QUESTIONNAIRE, HOW DIFFICULT HAVE THESE PROBLEMS MADE IT FOR YOU TO DO YOUR WORK, TAKE CARE OF THINGS AT HOME, OR GET ALONG WITH OTHER PEOPLE: SOMEWHAT DIFFICULT
5. BEING SO RESTLESS THAT IT IS HARD TO SIT STILL: 0
6. BECOMING EASILY ANNOYED OR IRRITABLE: 1
4. TROUBLE RELAXING: SEVERAL DAYS
4. TROUBLE RELAXING: 1
IF YOU CHECKED OFF ANY PROBLEMS ON THIS QUESTIONNAIRE, HOW DIFFICULT HAVE THESE PROBLEMS MADE IT FOR YOU TO DO YOUR WORK, TAKE CARE OF THINGS AT HOME, OR GET ALONG WITH OTHER PEOPLE: SOMEWHAT DIFFICULT
1. FEELING NERVOUS, ANXIOUS, OR ON EDGE: 1
7. FEELING AFRAID AS IF SOMETHING AWFUL MIGHT HAPPEN: NOT AT ALL
1. FEELING NERVOUS, ANXIOUS, OR ON EDGE: SEVERAL DAYS
2. NOT BEING ABLE TO STOP OR CONTROL WORRYING: SEVERAL DAYS
2. NOT BEING ABLE TO STOP OR CONTROL WORRYING: 1
6. BECOMING EASILY ANNOYED OR IRRITABLE: SEVERAL DAYS
5. BEING SO RESTLESS THAT IT IS HARD TO SIT STILL: NOT AT ALL
GAD7 TOTAL SCORE: 5
3. WORRYING TOO MUCH ABOUT DIFFERENT THINGS: 1
3. WORRYING TOO MUCH ABOUT DIFFERENT THINGS: SEVERAL DAYS
7. FEELING AFRAID AS IF SOMETHING AWFUL MIGHT HAPPEN: 0

## 2022-06-22 PROBLEM — E78.2 MIXED HYPERLIPIDEMIA: Status: ACTIVE | Noted: 2022-06-22

## 2022-06-22 PROBLEM — N32.81 OVERACTIVE BLADDER: Status: ACTIVE | Noted: 2022-06-22

## 2022-06-23 ENCOUNTER — TELEPHONE (OUTPATIENT)
Dept: FAMILY MEDICINE CLINIC | Facility: CLINIC | Age: 37
End: 2022-06-23

## 2022-06-23 NOTE — TELEPHONE ENCOUNTER
Pt states she has had poison for around 10 days and has tried multiple OTC creams with no relief. Pt is asking if something can be sent in. Please advise.

## 2022-06-24 ENCOUNTER — TELEMEDICINE (OUTPATIENT)
Dept: BEHAVIORAL/MENTAL HEALTH CLINIC | Age: 37
End: 2022-06-24
Payer: COMMERCIAL

## 2022-06-24 DIAGNOSIS — F31.75 BIPOLAR DISORDER, IN PARTIAL REMISSION, MOST RECENT EPISODE DEPRESSED (HCC): Primary | ICD-10-CM

## 2022-06-24 DIAGNOSIS — F41.1 GENERALIZED ANXIETY DISORDER: ICD-10-CM

## 2022-06-24 DIAGNOSIS — F51.05 INSOMNIA DUE TO MENTAL DISORDER: ICD-10-CM

## 2022-06-24 PROCEDURE — 99214 OFFICE O/P EST MOD 30 MIN: CPT | Performed by: PSYCHIATRY & NEUROLOGY

## 2022-06-24 RX ORDER — VENLAFAXINE HYDROCHLORIDE 150 MG/1
150 CAPSULE, EXTENDED RELEASE ORAL DAILY
Qty: 30 CAPSULE | Refills: 2 | Status: SHIPPED | OUTPATIENT
Start: 2022-06-24 | End: 2022-09-13 | Stop reason: SDUPTHER

## 2022-06-24 RX ORDER — DOXEPIN HYDROCHLORIDE 10 MG/1
10 CAPSULE ORAL NIGHTLY
Qty: 30 CAPSULE | Refills: 2 | Status: SHIPPED | OUTPATIENT
Start: 2022-06-24 | End: 2022-09-13 | Stop reason: ALTCHOICE

## 2022-06-24 RX ORDER — LAMOTRIGINE 100 MG/1
100 TABLET ORAL 2 TIMES DAILY
Qty: 60 TABLET | Refills: 2 | Status: SHIPPED | OUTPATIENT
Start: 2022-06-24 | End: 2022-09-13 | Stop reason: SDUPTHER

## 2022-06-24 RX ORDER — ACYCLOVIR 400 MG/1
TABLET ORAL
COMMUNITY
Start: 2022-04-19

## 2022-06-24 RX ORDER — LORATADINE 10 MG/1
10 TABLET ORAL DAILY PRN
COMMUNITY

## 2022-06-24 RX ORDER — CLONAZEPAM 0.5 MG/1
0.5 TABLET ORAL 2 TIMES DAILY PRN
Qty: 60 TABLET | Refills: 2 | Status: SHIPPED | OUTPATIENT
Start: 2022-06-24 | End: 2022-09-13 | Stop reason: SDUPTHER

## 2022-06-24 RX ORDER — DOXYCYCLINE HYCLATE 100 MG
TABLET ORAL
COMMUNITY
Start: 2022-04-19

## 2022-06-24 RX ORDER — ZOLPIDEM TARTRATE 10 MG/1
10 TABLET ORAL NIGHTLY PRN
Qty: 30 TABLET | Refills: 2 | Status: SHIPPED | OUTPATIENT
Start: 2022-06-24 | End: 2022-09-13 | Stop reason: ALTCHOICE

## 2022-06-24 ASSESSMENT — PATIENT HEALTH QUESTIONNAIRE - PHQ9
SUM OF ALL RESPONSES TO PHQ QUESTIONS 1-9: 6
7. TROUBLE CONCENTRATING ON THINGS, SUCH AS READING THE NEWSPAPER OR WATCHING TELEVISION: 0
SUM OF ALL RESPONSES TO PHQ QUESTIONS 1-9: 6
2. FEELING DOWN, DEPRESSED OR HOPELESS: 1
10. IF YOU CHECKED OFF ANY PROBLEMS, HOW DIFFICULT HAVE THESE PROBLEMS MADE IT FOR YOU TO DO YOUR WORK, TAKE CARE OF THINGS AT HOME, OR GET ALONG WITH OTHER PEOPLE: 1
3. TROUBLE FALLING OR STAYING ASLEEP: 1
5. POOR APPETITE OR OVEREATING: 3
8. MOVING OR SPEAKING SO SLOWLY THAT OTHER PEOPLE COULD HAVE NOTICED. OR THE OPPOSITE, BEING SO FIGETY OR RESTLESS THAT YOU HAVE BEEN MOVING AROUND A LOT MORE THAN USUAL: 0
1. LITTLE INTEREST OR PLEASURE IN DOING THINGS: 1
6. FEELING BAD ABOUT YOURSELF - OR THAT YOU ARE A FAILURE OR HAVE LET YOURSELF OR YOUR FAMILY DOWN: 0
SUM OF ALL RESPONSES TO PHQ QUESTIONS 1-9: 6
4. FEELING TIRED OR HAVING LITTLE ENERGY: 0
SUM OF ALL RESPONSES TO PHQ9 QUESTIONS 1 & 2: 2
SUM OF ALL RESPONSES TO PHQ QUESTIONS 1-9: 6
9. THOUGHTS THAT YOU WOULD BE BETTER OFF DEAD, OR OF HURTING YOURSELF: 0

## 2022-06-24 NOTE — PROGRESS NOTES
Patient:  Isaias Cummings  Age:  31 y.o.  :  1985     SEX:  female MRN:  [id number]     RACE: [unfilled]     SEEN:  [x]  PATIENT  []  SPOUSE []  OTHER:              PHQ-9  2022 3/3/2022 2021   Little interest or pleasure in doing things 1 - -   Little interest or pleasure in doing things - 1 1   Feeling down, depressed, or hopeless 1 - -   Trouble falling or staying asleep, or sleeping too much 1 - -   Trouble falling or staying asleep, or sleeping too much - 2 0   Feeling tired or having little energy 0 - -   Feeling tired or having little energy - 1 0   Poor appetite or overeating 3 - -   Poor appetite, weight loss, or overeating - 0 2   Feeling bad about yourself - or that you are a failure or have let yourself or your family down 0 - -   Feeling bad about yourself - or that you are a failure or have let yourself or your family down - 0 0   Trouble concentrating on things, such as reading the newspaper or watching television 0 - -   Trouble concentrating on things such as school, work, reading, or watching TV - 0 1   Moving or speaking so slowly that other people could have noticed. Or the opposite - being so fidgety or restless that you have been moving around a lot more than usual 0 - -   Moving or speaking so slowly that other people could have noticed; or the opposite being so fidgety that others notice - 0 0   Thoughts that you would be better off dead, or of hurting yourself in some way 0 - -   Thoughts of being better off dead, or hurting yourself in some way - 0 0   PHQ-2 Score 2 - -   Total Score PHQ 2 - 2 2   PHQ-9 Total Score 6 - -   PHQ 9 Score - 5 5   If you checked off any problems, how difficult have these problems made it for you to do your work, take care of things at home, or get along with other people?  1 - -   How difficult have these problems made it for you to do your work, take care of your home and get along with others - Not difficult at all Not difficult at all       NIKI-7 SCREENING 6/21/2022 3/3/2022 12/9/2021   Feeling nervous, anxious, or on edge Several days - -   Not being able to stop or control worrying Several days - -   Worrying too much about different things Several days - -   Trouble relaxing Several days - -   Being so restless that it is hard to sit still Not at all - -   Becoming easily annoyed or irritable Several days - -   Feeling afraid as if something awful might happen Not at all - -   NIKI-7 Total Score 5 - -   How difficult have these problems made it for you to do your work, take care of things at home, or get along with other people? Somewhat difficult Very difficult Not difficult at all   Feeling nervous, anxious, or on edge - Several days Several days   NIKI-7 Total Score - 7 6        I was at home while conducting this encounter. Consent:  She and/or her healthcare decision maker is aware that this patient-initiated Telehealth encounter is a billable service, with coverage as determined by her insurance carrier. She is aware that she may receive a bill and has provided verbal consent to proceed: YesPatient identification was verified, and a caregiver was present when appropriate. The patient was located in a state where the provider was credentialed to provide care. This virtual visit was conducted via [employer]. Pursuant to the emergency declaration under the [facility], [facility] waiver authority and the [employer] Act, this Virtual  Visit was conducted to reduce the patient's risk of exposure to COVID-19 and provide continuity of care for an established patient. Services were provided through a video synchronous discussion virtually to substitute for in-person clinic visit. Due to this being a TeleHealth evaluation, many elements of the physical examination are unable to be assessed. Total Time: minutes: 11-20 minutes.     Chief complaint:  Pt says she has been really stressed out. Subjective:  Seen virtually for follow-up. States has been really stressed out. Her 2 daughters stay with her mother and her niece states that her medic 2. LIBIA was called by her aunt on her knees. Her niece's boyfriend who stayed with them ended up on drugs. So DSS was involved and her kids got involved in the stool. She has been staying with her mother and  cannot go out with the kids. Cannot take them with her to her boyfriend's house. They have been ordered to stay with her mother and no one else. She does take care of her father and states at her mother's for 3 to 4 days. Has to stay away from her boyfriend. Supportive psychotherapy provided. She denies suicidal ideation/homicidal ideations. Denies symptoms of psychosis. Patient allowed to vent her emotions. Patient Active Problem List   Diagnosis    Other hemorrhoids    Gastroesophageal reflux disease without esophagitis    Bipolar disorder ([address]    Gestational hypertension    Chronic post-traumatic stress disorder (PTSD)    Poor dentition    Borderline personality disorder in remission ([address]    Endometriosis    Morbid obesity ([address]    Hashimoto's thyroiditis    Fatty liver    Adenomyosis of uterus    Urinary frequency    Urinary urgency    Recurrent UTI    Chronic migraine without aura without status migrainosus, not intractable    Mixed hyperlipidemia    Overactive bladder     LMP, Birth control IUD Mirena   Not pregnant    Denies palpitation,SOB, Chest pain, headaches. In no acute distress.        MEDICATION REVIEW:    Current Medications:    Current Outpatient Medications   Medication Sig    levonorgestrel (MIRENA) IUD 52 mg 1 Device by IntraUTERine route once    acyclovir (ZOVIRAX) 400 MG tablet TAKE 1 TABLET BY MOUTH THREE TIMES A DAY FOR 7 DAYS    doxycycline hyclate (VIBRA-TABS) 100 MG tablet TAKE 1 TABLET BY MOUTH EVERY 12 HOURS FOR 10 DAYS    loratadine (CLARITIN) 10 MG tablet Take 10 mg by mouth daily as needed    levothyroxine (SYNTHROID) 150 MCG tablet Take 1 tablet by mouth every morning (before breakfast)    solifenacin (VESICARE) 5 MG tablet TAKE 1 TABLET BY MOUTH EVERY DAY    albuterol sulfate  (90 Base) MCG/ACT inhaler Inhale 2 puffs into the lungs every 4 hours as needed    clonazePAM (KLONOPIN) 0.5 MG tablet Take 0.5 mg by mouth 2 times daily as needed.  doxepin (SINEQUAN) 10 MG capsule Take 10 mg by mouth    fesoterodine (TOVIAZ) 4 MG TB24 ER tablet Take 4 mg by mouth daily    lamoTRIgine (LAMICTAL) 100 MG tablet Take 100 mg by mouth 2 times daily    pantoprazole (PROTONIX) 40 MG tablet TAKE 1 TABLET BY MOUTH EVERY DAY    venlafaxine (EFFEXOR XR) 150 MG extended release capsule Take 150 mg by mouth daily    zolpidem (AMBIEN) 10 MG tablet Take 10 mg by mouth. No current facility-administered medications for this visit. Allergies   Allergen Reactions    Adhesive Tape Rash     Other reaction(s): Rash-Allergy    Escitalopram Other (See Comments)     hallucinations       Past Medical History, Past Surgical History, Family history, Social History, and Medications were all reviewed with the patient today and updated as necessary.      Compliant with medication: Yes   Side effects from medications:  No     EXAMINATION  Musculoskeletal    GAIT AND STATION   [x] WNL   [] RESTRICTED   [] UNSTEADY WALK        [] ABNORMAL   [] UNBALANCED         PSYCHIATRIC     GENERAL APPEARANCE:   [x]  WELL GROOMED []     DISHEVELED   []  UNKEMPT      []  UNUSUAL/BIZZARE    [] WNL       ATTITUDE:   [x] COOPERATIVE   [] GUARDED   [] SUSPICIOUS      [] HOSTILE                            BEHAVIOR:   [x] CALM   [] HYPERACTIVE   [] MANNERISMS      [] BIZZARE         SPEECH:   [x] NORMAL FOR CLIENT   [] SPONTANEOUS   [] SLURRED   [] WHISPERING      [] LOUD   [] PRESSURED   [] ARTICULATE       EYE CONTACT:   [x] WNL   [] BLANK STARE   [] INTENSE      [] AVOIDANT         MOOD: [] EUTHYMIC   [x] ANXIOUS   [x] DEPRESSED      [] IRRITABLE   [] ANGRY   [] APATHETIC     AFFECT:   [x] CONGRUENT WITH MOOD   [] FLAT   [] CONSTRICTED      [] INAPPROPRIATE   [] LABILE           THOUGHT PROCESS:   [x] LOGICAL/GOAL-DIRECTED   [] FOI   [] CIRCUMSANTIAL      [] INCOHERENT   [] TANGENTIAL   [] CONCRETE      [] PERSEVERATION           THOUGHT CONTENT:                DELUSIONS  [x] DENIES  [] GRANDIOSE  [] PERSECUTORY  [] Holiness  [] REFERENCE   HALLUCINATIONS  [x] DENIES  [] AUDITORY  [] VISUAL  [] OLFACTORY  [] TACTILE     [] GUSTATORY  [] SOMATIC         OBSESSIONS  [x] DENIES  [] PRESENT         SUICIDAL IDEATION  [x] DENIES  [] PRESENT W/O PLAN  [] PRESENT W/ PLAN       HOMICIDAL IDEATION  [x] DENIES  [] PRESENT W/O PLAN  [] PRESENT W/ PLAN           JUDGEMENT:   [x] GOOD   [] FAIR   [] POOR   INSIGHT:   [x] GOOD   [] FAIR   [] POOR     COGNITION:           SENSORIUM:   [x] ALERT   [] CLOUDED   [] DROWSY     ORIENTATION:   [x] INTACT   [] TIME:  PLACE  PERSON   RECENT & REMOTE MEMORY:   [] NORMAL   [x] OTHER:                  ATTENTION:   [] INTACT   [x] MILD IMPAIRMENT   [] SEVERE IMPAIRMENT     CONCENTRATION:   [] INTACT   [x] MILD IMPAIRMENT   [] SEVERE IMPAIRMENT     LANGUAGE:   [x] AVERAGE   [] ABOVE AVERAGE   [] BELOW AVERAGE     FUND OF KNOWLEDGE:   [] UNABLE TO ASSESS AT THIS TIME   [x] AVERAGE   [] ABOVE AVERAGE   [] BELOW AVERAGE      [] GOOD TO EXCELLENT KNOWLEDGE OF CURRENT EVENTS   [] POOR TO NO KNLEDGE OF CURRENT EVENTS           ABNORMAL MOVEMENTS:   [x] NONE   [] TICS   [] TREMORS   [] BIZZARE      [] FACE   [] TRUNK   [] EXTREMETIES   [] GESTURES        SLEEP:   [] GOOD   [x] FAIR   [] POOR      MUSCLE STRENGTH AND TONE   [x] WNL   [] ATROPHY   [] SPASTIC        [] FLACCID   [] COGWHEEL         Diagnoses/Impressions:    ICD-10-CM    1. Bipolar disorder, in partial remission, most recent episode depressed ([facility]  F31.75    2. Generalized anxiety disorder  F41.1    3.  Insomnia due to mental disorder  F51.05        TREATMENT GOALS:  Symptom reduction, Medication adherence, maintain therapeutic gains    LABS/IMAGING:    []  Ordered [x]  Reviewed []  New Labs Ordered:     LAB  WBC   Date/Time Value Ref Range Status   08/23/2021 10:07 AM 9.1 3.4 - 10.8 x10E3/uL Final     Hemoglobin   Date/Time Value Ref Range Status   08/23/2021 10:07 AM 13.1 11.1 - 15.9 g/dL Final     Hematocrit   Date/Time Value Ref Range Status   08/23/2021 10:07 AM 42.1 34.0 - 46.6 % Final     Platelets   Date/Time Value Ref Range Status   08/23/2021 10:07  150 - 450 x10E3/uL Final     Sodium   Date/Time Value Ref Range Status   11/15/2021 11:28  134 - 144 mmol/L Final     Potassium   Date/Time Value Ref Range Status   11/15/2021 11:28 AM 4.9 3.5 - 5.2 mmol/L Final     Chloride   Date/Time Value Ref Range Status   11/15/2021 11:28  96 - 106 mmol/L Final     CO2   Date/Time Value Ref Range Status   11/15/2021 11:28 AM 22 20 - 29 mmol/L Final     BUN   Date/Time Value Ref Range Status   11/15/2021 11:28 AM 8 6 - 20 mg/dL Final     Magnesium   Date/Time Value Ref Range Status   03/23/2021 10:29 AM 1.9 1.6 - 2.3 mg/dL Final     ALT   Date/Time Value Ref Range Status   08/23/2021 10:07 AM 12 0 - 32 IU/L Final     AST   Date/Time Value Ref Range Status   08/23/2021 10:07 AM 14 0 - 40 IU/L Final     TSH   Date/Time Value Ref Range Status   11/15/2021 11:28 AM 3.880 0.450 - 4.500 uIU/mL Final       Please refer to the lab tab in the epic and care everywhere for the most recent lab results. Plan:     [x]  Medication ordered: Effexor, klonopin, doxepin, ambien, lamictal to target depression, anxiety, insomnia, mood stabilization. [x]  Medication education/counseling provided  Medication dosage and time to take, purpose/expected benefits/risks, common side effects, lab monitoring required and reason, expected length of treatment, risk of no treatment, effects on pregnancy/nursing, financial availability.  Educated patient on  side effects/risks/benefits of meds including cardiac arrhythmias, suicidal ideations,orthostatic hypotension, serotonin syndrome, risk of elizabeth/hypomania from antidepressants, withdrawals from abrupt discontinuation of meds, risk of rash, risk of infection, risk of bleeding, risk of seizures, addiction potential, memory impairment with long term use of benzos, respiratory depression, high blood pressure, dizziness, drowsiness, sedation , risk of falls, Risk & benefits discussed: including but not limited to possible off-label medication usage. [x] Follow MSE for sxs improvement     I have reviewed the patients controlled substance prescription history, as maintained in the [place] prescription monitoring program, so that the prescription(s) for a  controlled substance can be given. Recommendations and Referrals: Follow up with : MD, requires monitoring of response to medication, requires monitoring of medication side effects. Time until next PMA:     Follow up with Mental Health Clinicians: psychotherapy interventions, improve level of functioning, monitoring to prevent decompensation /hospitalization, monitoring to maintain therapeutic gains, symptoms (resolving and controlled)           Psychotherapy note:                                __10_ Minutes of psychotherapy     [x]  Supportive psychotherapy, Patient discussed certain situational and personal stressors ongoing in her life at this time, weight management d/w the patient. Sleep hygiene d/w patient. Patient allowed to vent out her emotions. Scenarios were reviewed using role playing and CBT techniques in order to increase insight and decrease anxiety. []  Disposition planning      []  Dangerous and will not contract for safety in the community    **Pateint has been notified: They are to call 911 or go to their nearest E.R. if they are experiencing a medical emergency or suicidal ideations/homicidal ideations. **  All ancillary documentation entered reviewed by provider. PLEASE NOTE:  This document has been produced in part or whole using voice recognition software. Proofread however unrecognized errors in transcription may be present.         Jesus Wall MD

## 2022-06-28 RX ORDER — LEVOTHYROXINE SODIUM 0.15 MG/1
TABLET ORAL
Qty: 30 TABLET | Refills: 0 | Status: SHIPPED | OUTPATIENT
Start: 2022-06-28 | End: 2022-07-14 | Stop reason: SDUPTHER

## 2022-07-14 ENCOUNTER — OFFICE VISIT (OUTPATIENT)
Dept: FAMILY MEDICINE CLINIC | Facility: CLINIC | Age: 37
End: 2022-07-14
Payer: COMMERCIAL

## 2022-07-14 VITALS
DIASTOLIC BLOOD PRESSURE: 78 MMHG | OXYGEN SATURATION: 99 % | WEIGHT: 256 LBS | SYSTOLIC BLOOD PRESSURE: 120 MMHG | HEIGHT: 63 IN | BODY MASS INDEX: 45.36 KG/M2 | TEMPERATURE: 98.7 F | HEART RATE: 82 BPM

## 2022-07-14 DIAGNOSIS — E78.5 HYPERLIPIDEMIA, UNSPECIFIED HYPERLIPIDEMIA TYPE: ICD-10-CM

## 2022-07-14 DIAGNOSIS — F60.3: ICD-10-CM

## 2022-07-14 DIAGNOSIS — Z79.899 MEDICATION MANAGEMENT: ICD-10-CM

## 2022-07-14 DIAGNOSIS — E06.3 HASHIMOTO'S THYROIDITIS: Primary | ICD-10-CM

## 2022-07-14 DIAGNOSIS — K76.0 FATTY LIVER: ICD-10-CM

## 2022-07-14 DIAGNOSIS — F43.12 CHRONIC POST-TRAUMATIC STRESS DISORDER (PTSD): ICD-10-CM

## 2022-07-14 DIAGNOSIS — G43.709 CHRONIC MIGRAINE WITHOUT AURA WITHOUT STATUS MIGRAINOSUS, NOT INTRACTABLE: ICD-10-CM

## 2022-07-14 DIAGNOSIS — N32.81 OVERACTIVE BLADDER: ICD-10-CM

## 2022-07-14 DIAGNOSIS — K21.9 GASTROESOPHAGEAL REFLUX DISEASE WITHOUT ESOPHAGITIS: ICD-10-CM

## 2022-07-14 DIAGNOSIS — E66.01 MORBID OBESITY (HCC): ICD-10-CM

## 2022-07-14 DIAGNOSIS — F31.75 BIPOLAR DISORDER, IN PARTIAL REMISSION, MOST RECENT EPISODE DEPRESSED (HCC): ICD-10-CM

## 2022-07-14 DIAGNOSIS — N80.9 ENDOMETRIOSIS: ICD-10-CM

## 2022-07-14 LAB
ALBUMIN SERPL-MCNC: 3.3 G/DL (ref 3.5–5)
ALBUMIN/GLOB SERPL: 1 {RATIO} (ref 1.2–3.5)
ALP SERPL-CCNC: 97 U/L (ref 50–136)
ALT SERPL-CCNC: 18 U/L (ref 12–65)
ANION GAP SERPL CALC-SCNC: 3 MMOL/L (ref 7–16)
AST SERPL-CCNC: 16 U/L (ref 15–37)
BILIRUB SERPL-MCNC: 0.2 MG/DL (ref 0.2–1.1)
BUN SERPL-MCNC: 5 MG/DL (ref 6–23)
CALCIUM SERPL-MCNC: 9.2 MG/DL (ref 8.3–10.4)
CHLORIDE SERPL-SCNC: 107 MMOL/L (ref 98–107)
CHOLEST SERPL-MCNC: 188 MG/DL
CO2 SERPL-SCNC: 27 MMOL/L (ref 21–32)
CREAT SERPL-MCNC: 0.7 MG/DL (ref 0.6–1)
ERYTHROCYTE [DISTWIDTH] IN BLOOD BY AUTOMATED COUNT: 14 % (ref 11.9–14.6)
GLOBULIN SER CALC-MCNC: 3.2 G/DL (ref 2.3–3.5)
GLUCOSE SERPL-MCNC: 73 MG/DL (ref 65–100)
HCT VFR BLD AUTO: 40.9 % (ref 35.8–46.3)
HDLC SERPL-MCNC: 33 MG/DL (ref 40–60)
HDLC SERPL: 5.7 {RATIO}
HGB BLD-MCNC: 12.7 G/DL (ref 11.7–15.4)
LDLC SERPL CALC-MCNC: 116 MG/DL
MCH RBC QN AUTO: 27 PG (ref 26.1–32.9)
MCHC RBC AUTO-ENTMCNC: 31.1 G/DL (ref 31.4–35)
MCV RBC AUTO: 86.8 FL (ref 79.6–97.8)
NRBC # BLD: 0 K/UL (ref 0–0.2)
PLATELET # BLD AUTO: 388 K/UL (ref 150–450)
PMV BLD AUTO: 9.8 FL (ref 9.4–12.3)
POTASSIUM SERPL-SCNC: 4 MMOL/L (ref 3.5–5.1)
PROT SERPL-MCNC: 6.5 G/DL (ref 6.3–8.2)
RBC # BLD AUTO: 4.71 M/UL (ref 4.05–5.2)
SODIUM SERPL-SCNC: 137 MMOL/L (ref 136–145)
TRIGL SERPL-MCNC: 195 MG/DL (ref 35–150)
TSH, 3RD GENERATION: 2.05 UIU/ML (ref 0.36–3.74)
VLDLC SERPL CALC-MCNC: 39 MG/DL (ref 6–23)
WBC # BLD AUTO: 8.9 K/UL (ref 4.3–11.1)

## 2022-07-14 PROCEDURE — 99214 OFFICE O/P EST MOD 30 MIN: CPT | Performed by: NURSE PRACTITIONER

## 2022-07-14 RX ORDER — LEVOTHYROXINE SODIUM 0.15 MG/1
TABLET ORAL
Qty: 30 TABLET | Refills: 0 | Status: SHIPPED | OUTPATIENT
Start: 2022-07-14 | End: 2022-07-15 | Stop reason: SDUPTHER

## 2022-07-14 ASSESSMENT — PATIENT HEALTH QUESTIONNAIRE - PHQ9
SUM OF ALL RESPONSES TO PHQ QUESTIONS 1-9: 2
SUM OF ALL RESPONSES TO PHQ9 QUESTIONS 1 & 2: 2
1. LITTLE INTEREST OR PLEASURE IN DOING THINGS: 1
SUM OF ALL RESPONSES TO PHQ QUESTIONS 1-9: 2
2. FEELING DOWN, DEPRESSED OR HOPELESS: 1
SUM OF ALL RESPONSES TO PHQ QUESTIONS 1-9: 2
SUM OF ALL RESPONSES TO PHQ QUESTIONS 1-9: 2

## 2022-07-14 ASSESSMENT — ENCOUNTER SYMPTOMS
SHORTNESS OF BREATH: 0
DIARRHEA: 0
ABDOMINAL PAIN: 0
VOMITING: 0
NAUSEA: 0
CONSTIPATION: 1
BLOOD IN STOOL: 1

## 2022-07-14 NOTE — ASSESSMENT & PLAN NOTE
discussed avoiding high carb foods and sugar. Cutting back on sugar and bread. Eating salads, fruits and vegetables.

## 2022-07-14 NOTE — ASSESSMENT & PLAN NOTE
Monitored by specialist- no acute findings meriting change in the plan  Still painful, periods stopped, tried MIRENA prior to considering hysterectomy.

## 2022-07-14 NOTE — PROGRESS NOTES
Griselda Ramirez is a 31 y.o. female who presents today for the following:  Chief Complaint   Patient presents with    Thyroid Problem       Allergies   Allergen Reactions    Adhesive Tape Rash     Other reaction(s): Rash-Allergy    Escitalopram Other (See Comments)     hallucinations       Current Outpatient Medications   Medication Sig Dispense Refill    levothyroxine (SYNTHROID) 150 MCG tablet TAKE 1 TABLET BY MOUTH EVERY DAY BEFORE BREAKFAST 30 tablet 0    levonorgestrel (MIRENA) IUD 52 mg 1 Device by IntraUTERine route once      acyclovir (ZOVIRAX) 400 MG tablet TAKE 1 TABLET BY MOUTH THREE TIMES A DAY FOR 7 DAYS      doxycycline hyclate (VIBRA-TABS) 100 MG tablet TAKE 1 TABLET BY MOUTH EVERY 12 HOURS FOR 10 DAYS      loratadine (CLARITIN) 10 MG tablet Take 10 mg by mouth daily as needed      clonazePAM (KLONOPIN) 0.5 MG tablet Take 1 tablet by mouth 2 times daily as needed for Anxiety for up to 90 days. 60 tablet 2    doxepin (SINEQUAN) 10 MG capsule Take 1 capsule by mouth nightly 30 capsule 2    lamoTRIgine (LAMICTAL) 100 MG tablet Take 1 tablet by mouth 2 times daily 60 tablet 2    venlafaxine (EFFEXOR XR) 150 MG extended release capsule Take 1 capsule by mouth daily 30 capsule 2    zolpidem (AMBIEN) 10 MG tablet Take 1 tablet by mouth nightly as needed for Sleep for up to 90 days. 30 tablet 2    solifenacin (VESICARE) 5 MG tablet TAKE 1 TABLET BY MOUTH EVERY DAY 30 tablet 2    albuterol sulfate  (90 Base) MCG/ACT inhaler Inhale 2 puffs into the lungs every 4 hours as needed      fesoterodine (TOVIAZ) 4 MG TB24 ER tablet Take 4 mg by mouth daily      pantoprazole (PROTONIX) 40 MG tablet TAKE 1 TABLET BY MOUTH EVERY DAY       No current facility-administered medications for this visit.        Past Medical History:   Diagnosis Date    Anxiety     Bipolar 1 disorder ([address]     Endometriosis     GERD (gastroesophageal reflux disease)     Hypercholesterolemia     Hypothyroidism     [facility]bid [facility]     Poor dentition     PTSD (post-traumatic stress disorder)     Due to traumatic death of family member       Past Surgical History:   Procedure Laterality Date    APPENDECTOMY  2013    CHOLECYSTECTOMY, LAPAROSCOPIC  2018    ND APPENDECTOMY      TUBAL LIGATION  2016    WISDOM TOOTH EXTRACTION         Social History     Tobacco Use    Smoking status: Former Smoker     Packs/day: 0.50     Quit date: 2009     Years since quittin.5    Smokeless tobacco: Never Used   Substance Use Topics    Alcohol use: Not Currently     Alcohol/week: 0.0 standard drinks        Family History   Problem Relation Age of Onset    Rheum Arthritis Father     Heart Disease Paternal Grandfather     Heart Surgery Father     High Cholesterol Father     Thyroid Disease Father     Diabetes Mother         type 2    Hypertension Mother     Other Mother         diverticular disease    No Known Problems Brother     Breast Cancer Maternal Grandmother     Cancer Maternal Grandfather         kidney    Heart Disease Paternal Grandmother     Rheum Arthritis Paternal Grandmother     Hypertension Paternal Grandmother     Heart Disease Father        HPI   Pt presents for follow up for medication refills. Reports some fatigue and weight gain since last visit. She has history of Hashimoto's hypothyroidism, hyperlipidemia (family hx of heart disease), GERD (was followed by GI), demyelinating disease, fatty liver, chronic migraines, bipolar disorder, morbid obesity, PTSD, endometriosis, recurrent UTIs, Mirena IUD, and overactive bladder. She is followed by urology, neurology, and psychiatry. Did not qualify for cpap. Review of Systems   Constitutional: Positive for fatigue. Eyes: Positive for visual disturbance. Feels vision weaker. 1-1.5 years ago. Respiratory: Negative for shortness of breath.     Cardiovascular: Negative for chest pain, palpitations and leg swelling. Gastrointestinal: Positive for blood in stool and constipation. Negative for abdominal pain, diarrhea, nausea and vomiting. Blood stools 3-4 days ago from anal fissure and hemorrhoids. Miralax helps with constipation. Endocrine: Negative for cold intolerance and heat intolerance. Genitourinary: Negative for difficulty urinating. Musculoskeletal: Positive for arthralgias. Negative for myalgias. Left knee. Tried elevating. Skin:        Hair thinning on thyroid medication. Neurological: Negative for dizziness, weakness and headaches. Psychiatric/Behavioral: Positive for dysphoric mood. Negative for sleep disturbance and suicidal ideas. The patient is nervous/anxious. /78   Pulse 82   Temp 98.7 °F (37.1 °C)   Ht 5' 3\" (1.6 m)   Wt 256 lb (116.1 kg)   SpO2 99%   BMI 45.35 kg/m²     Physical Exam  Constitutional:       General: She is not in acute distress. Appearance: Normal appearance. She is obese. She is not ill-appearing, toxic-appearing or diaphoretic. HENT:      Head: Normocephalic and atraumatic. Right Ear: External ear normal.      Left Ear: External ear normal.   Eyes:      Extraocular Movements: Extraocular movements intact. Conjunctiva/sclera: Conjunctivae normal.      Pupils: Pupils are equal, round, and reactive to light. Cardiovascular:      Rate and Rhythm: Normal rate and regular rhythm. Pulses: Normal pulses. Heart sounds: Normal heart sounds. Pulmonary:      Effort: Pulmonary effort is normal.      Breath sounds: Normal breath sounds. Abdominal:      General: Bowel sounds are normal. There is no distension. Palpations: Abdomen is soft. Musculoskeletal:         General: Normal range of motion. Cervical back: Normal range of motion and neck supple. Right lower leg: No edema. Left lower leg: No edema. Skin:     General: Skin is warm and dry. Coloration: Skin is not jaundiced or pale. Neurological:      General: No focal deficit present. Mental Status: She is alert and oriented to person, place, and time. Psychiatric:         Mood and Affect: Mood normal.         Behavior: Behavior normal.         Thought Content: Thought content normal.         Judgment: Judgment normal.          1. Hashimoto's thyroiditis  Assessment & Plan:   check labs, walks alot, fatigued and gaining weight. Takes thyroid medication as recommended. Orders:  -     TSH; Future  -     levothyroxine (SYNTHROID) 150 MCG tablet; TAKE 1 TABLET BY MOUTH EVERY DAY BEFORE BREAKFAST, Disp-30 tablet, R-0Normal  2. Fatty liver  Assessment & Plan:   discussed avoiding high carb foods and sugar. Cutting back on sugar and bread. Eating salads, fruits and vegetables. Orders:  -     Comprehensive Metabolic Panel; Future  3. Medication management  -     CBC; Future  -     Comprehensive Metabolic Panel; Future  -     Lipid Panel; Future  -     TSH; Future  4. Hyperlipidemia, unspecified hyperlipidemia type  -     Lipid Panel; Future  5. Gastroesophageal reflux disease without esophagitis  Assessment & Plan:   Well-controlled, no meds required, watching diet  6. Chronic migraine without aura without status migrainosus, not intractable  Assessment & Plan:   Monitored by specialist- no acute findings meriting change in the plan  7. Overactive bladder  Assessment & Plan:   Monitored by specialist- no acute findings meriting change in the plan  8. Bipolar disorder, in partial remission, most recent episode depressed [facility])  Assessment & Plan:   Monitored by specialist- no acute findings meriting change in the plan  9. Chronic post-traumatic stress disorder (PTSD)  Assessment & Plan:   Monitored by specialist- no acute findings meriting change in the plan  10. Borderline personality disorder in remission [facility])  Assessment & Plan:   Monitored by specialist- no acute findings meriting change in the plan  11.  Endometriosis  Assessment & Plan:   Monitored by specialist- no acute findings meriting change in the plan  Still painful, periods stopped, tried MIRENA prior to considering hysterectomy. 12. Morbid obesity ([address]  Assessment & Plan:   struggled with weight for years. Tried diet, exercise, sleep, and stress level. Orders:  -     [address] [facility], [address] [place]       Patient informed, we will call with blood work results within one week. If you have not heard regarding results in over a week, please contact office. You can also review results on [employer]. Follow-up and Dispositions    · Return in about 6 months (around 1/14/2023) for Routine Visit, Labs, Medication Evaluation.          Hui Jade, PEÑA - CNP

## 2022-07-15 DIAGNOSIS — E06.3 HASHIMOTO'S THYROIDITIS: ICD-10-CM

## 2022-07-15 RX ORDER — LEVOTHYROXINE SODIUM 0.15 MG/1
TABLET ORAL
Qty: 30 TABLET | Refills: 5 | Status: SHIPPED | OUTPATIENT
Start: 2022-07-15

## 2022-09-13 ENCOUNTER — TELEMEDICINE (OUTPATIENT)
Dept: BEHAVIORAL/MENTAL HEALTH CLINIC | Age: 37
End: 2022-09-13
Payer: COMMERCIAL

## 2022-09-13 DIAGNOSIS — F31.31 BIPOLAR AFFECTIVE DISORDER, CURRENTLY DEPRESSED, MILD (HCC): Primary | ICD-10-CM

## 2022-09-13 DIAGNOSIS — F51.05 INSOMNIA DUE TO MENTAL DISORDER: ICD-10-CM

## 2022-09-13 DIAGNOSIS — F60.3 BORDERLINE PERSONALITY DISORDER (HCC): ICD-10-CM

## 2022-09-13 DIAGNOSIS — F43.10 POST-TRAUMATIC STRESS DISORDER, UNSPECIFIED: ICD-10-CM

## 2022-09-13 DIAGNOSIS — F41.1 GENERALIZED ANXIETY DISORDER: ICD-10-CM

## 2022-09-13 PROCEDURE — 99442 PR PHYS/QHP TELEPHONE EVALUATION 11-20 MIN: CPT | Performed by: PSYCHIATRY & NEUROLOGY

## 2022-09-13 RX ORDER — VENLAFAXINE HYDROCHLORIDE 150 MG/1
150 CAPSULE, EXTENDED RELEASE ORAL DAILY
Qty: 30 CAPSULE | Refills: 3 | Status: SHIPPED | OUTPATIENT
Start: 2022-09-13

## 2022-09-13 RX ORDER — LAMOTRIGINE 150 MG/1
TABLET ORAL
Qty: 60 TABLET | Refills: 3 | Status: SHIPPED | OUTPATIENT
Start: 2022-09-13

## 2022-09-13 RX ORDER — CLONAZEPAM 0.5 MG/1
0.5 TABLET ORAL 3 TIMES DAILY PRN
Qty: 90 TABLET | Refills: 2 | Status: SHIPPED | OUTPATIENT
Start: 2022-09-13 | End: 2022-12-12

## 2022-09-13 ASSESSMENT — PATIENT HEALTH QUESTIONNAIRE - PHQ9
8. MOVING OR SPEAKING SO SLOWLY THAT OTHER PEOPLE COULD HAVE NOTICED. OR THE OPPOSITE, BEING SO FIGETY OR RESTLESS THAT YOU HAVE BEEN MOVING AROUND A LOT MORE THAN USUAL: 0
4. FEELING TIRED OR HAVING LITTLE ENERGY: 1
SUM OF ALL RESPONSES TO PHQ9 QUESTIONS 1 & 2: 2
10. IF YOU CHECKED OFF ANY PROBLEMS, HOW DIFFICULT HAVE THESE PROBLEMS MADE IT FOR YOU TO DO YOUR WORK, TAKE CARE OF THINGS AT HOME, OR GET ALONG WITH OTHER PEOPLE: 1
SUM OF ALL RESPONSES TO PHQ QUESTIONS 1-9: 9
2. FEELING DOWN, DEPRESSED OR HOPELESS: 1
SUM OF ALL RESPONSES TO PHQ QUESTIONS 1-9: 9
7. TROUBLE CONCENTRATING ON THINGS, SUCH AS READING THE NEWSPAPER OR WATCHING TELEVISION: 1
9. THOUGHTS THAT YOU WOULD BE BETTER OFF DEAD, OR OF HURTING YOURSELF: 0
SUM OF ALL RESPONSES TO PHQ QUESTIONS 1-9: 9
1. LITTLE INTEREST OR PLEASURE IN DOING THINGS: 1
3. TROUBLE FALLING OR STAYING ASLEEP: 1
6. FEELING BAD ABOUT YOURSELF - OR THAT YOU ARE A FAILURE OR HAVE LET YOURSELF OR YOUR FAMILY DOWN: 1
SUM OF ALL RESPONSES TO PHQ QUESTIONS 1-9: 9
5. POOR APPETITE OR OVEREATING: 3

## 2022-09-13 ASSESSMENT — ANXIETY QUESTIONNAIRES
1. FEELING NERVOUS, ANXIOUS, OR ON EDGE: 1
IF YOU CHECKED OFF ANY PROBLEMS ON THIS QUESTIONNAIRE, HOW DIFFICULT HAVE THESE PROBLEMS MADE IT FOR YOU TO DO YOUR WORK, TAKE CARE OF THINGS AT HOME, OR GET ALONG WITH OTHER PEOPLE: SOMEWHAT DIFFICULT
3. WORRYING TOO MUCH ABOUT DIFFERENT THINGS: 2
4. TROUBLE RELAXING: 1
5. BEING SO RESTLESS THAT IT IS HARD TO SIT STILL: 0
6. BECOMING EASILY ANNOYED OR IRRITABLE: 1
2. NOT BEING ABLE TO STOP OR CONTROL WORRYING: 0
7. FEELING AFRAID AS IF SOMETHING AWFUL MIGHT HAPPEN: 0
GAD7 TOTAL SCORE: 5

## 2022-09-13 NOTE — PROGRESS NOTES
Patient:  Daniel Baldwin  Age:  30 y.o.  :  1985     SEX:  female MRN:  [id number]     RACE: White (non-)     SEEN:  [x]  PATIENT  []  SPOUSE []  OTHER:              PHQ-9  2022   Little interest or pleasure in doing things 1 1 1   Little interest or pleasure in doing things - - -   Feeling down, depressed, or hopeless 1 1 1   Trouble falling or staying asleep, or sleeping too much 1 - 1   Trouble falling or staying asleep, or sleeping too much - - -   Feeling tired or having little energy 1 - 0   Feeling tired or having little energy - - -   Poor appetite or overeating 3 - 3   Poor appetite, weight loss, or overeating - - -   Feeling bad about yourself - or that you are a failure or have let yourself or your family down 1 - 0   Feeling bad about yourself - or that you are a failure or have let yourself or your family down - - -   Trouble concentrating on things, such as reading the newspaper or watching television 1 - 0   Trouble concentrating on things such as school, work, reading, or watching TV - - -   Moving or speaking so slowly that other people could have noticed. Or the opposite - being so fidgety or restless that you have been moving around a lot more than usual 0 - 0   Moving or speaking so slowly that other people could have noticed; or the opposite being so fidgety that others notice - - -   Thoughts that you would be better off dead, or of hurting yourself in some way 0 - 0   Thoughts of being better off dead, or hurting yourself in some way - - -   PHQ-2 Score 2 2 2   Total Score PHQ 2 - - -   PHQ-9 Total Score 9 2 6   PHQ 9 Score - - -   If you checked off any problems, how difficult have these problems made it for you to do your work, take care of things at home, or get along with other people?  1 - 1   How difficult have these problems made it for you to do your work, take care of your home and get along with others - - -       NIKI-7 SCREENING 9/13/2022 6/21/2022 3/3/2022   Feeling nervous, anxious, or on edge Several days Several days -   Not being able to stop or control worrying Not at all Several days -   Worrying too much about different things More than half the days Several days -   Trouble relaxing Several days Several days -   Being so restless that it is hard to sit still Not at all Not at all -   Becoming easily annoyed or irritable Several days Several days -   Feeling afraid as if something awful might happen Not at all Not at all -   NIKI-7 Total Score 5 5 -   How difficult have these problems made it for you to do your work, take care of things at home, or get along with other people? Somewhat difficult Somewhat difficult Very difficult   Feeling nervous, anxious, or on edge - - Several days   NIKI-7 Total Score - - 7        I was at home while conducting this encounter. Consent:  She and/or her healthcare decision maker is aware that this patient-initiated Telehealth encounter is a billable service, with coverage as determined by her insurance carrier. She is aware that she may receive a bill and has provided verbal consent to proceed: YesPatient identification was verified, and a caregiver was present when appropriate. The patient was located in a state where the provider was credentialed to provide care. This virtual visit was conducted telephone encounter only. -  I affirm this is a Patient Initiated Episode with an Established Patient who has not had a related appointment within my department in the past 7 days or scheduled within the next 24 hours. Note: this encounter is not billable if this call serves to triage the patient into an appointment for the relevant concern. Total Time: minutes: 11-20 minutes. Chief complaint:  Pt says she has been sleeping and has been more anxious. Subjective:  Phone visit. States has been sleeping more and has been more anxious. Not eating much. Has been irritable.   Has not been taking her nighttime medications because of increased sleep. Has gotten her thyroid levels done and they are normal.  Her youngest daughter started K5 and noticed ninth grade. Oldest is in on her classes and doing really good in school. States she is trying to make a transition to get her daughters more often to stay with her. 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 Gets along with her boyfriend. Supportive psychotherapy provided. She denies suicidal ideation/homicidal ideations. Denies symptoms psychosis. We will increase the dose of lamotrigine to 150 mg twice daily. Titration explained. We will increase the dose of Klonopin 0.5 mg 3 times daily as needed for anxiety. Patient Active Problem List   Diagnosis    Other hemorrhoids    Gastroesophageal reflux disease without esophagitis    Bipolar disorder ([address]    Gestational hypertension    Chronic post-traumatic stress disorder (PTSD)    Poor dentition    Borderline personality disorder in remission ([facility]    Endometriosis    Morbid obesity ([facility]    Hashimoto's thyroiditis    Fatty liver    Adenomyosis of uterus    Urinary frequency    Urinary urgency    Recurrent UTI    Chronic migraine without aura without status migrainosus, not intractable    Mixed hyperlipidemia    Overactive bladder     LMP, Birth control IUD  Not pregnant    Denies palpitation,SOB, Chest pain, headaches. In no acute distress. MEDICATION REVIEW:    Current Medications:    Current Outpatient Medications   Medication Sig    clonazePAM (KLONOPIN) 0.5 MG tablet Take 1 tablet by mouth 3 times daily as needed for Anxiety for up to 90 days.     lamoTRIgine (LAMICTAL) 150 MG tablet Take 150 mg in the morning and 100 mg at bedtime for 2 weeks then 150 mg twice daily    venlafaxine (EFFEXOR XR) 150 MG extended release capsule Take 1 capsule by mouth daily    levothyroxine (SYNTHROID) 150 MCG tablet TAKE 1 TABLET BY MOUTH EVERY DAY BEFORE BREAKFAST    levonorgestrel (MIRENA) IUD 52 mg 1 Device by IntraUTERine route once    acyclovir (ZOVIRAX) 400 MG tablet TAKE 1 TABLET BY MOUTH THREE TIMES A DAY FOR 7 DAYS    doxycycline hyclate (VIBRA-TABS) 100 MG tablet TAKE 1 TABLET BY MOUTH EVERY 12 HOURS FOR 10 DAYS    loratadine (CLARITIN) 10 MG tablet Take 10 mg by mouth daily as needed    solifenacin (VESICARE) 5 MG tablet TAKE 1 TABLET BY MOUTH EVERY DAY    albuterol sulfate  (90 Base) MCG/ACT inhaler Inhale 2 puffs into the lungs every 4 hours as needed    fesoterodine (TOVIAZ) 4 MG TB24 ER tablet Take 4 mg by mouth daily    pantoprazole (PROTONIX) 40 MG tablet TAKE 1 TABLET BY MOUTH EVERY DAY     No current facility-administered medications for this visit. Allergies   Allergen Reactions    Adhesive Tape Rash     Other reaction(s): Rash-Allergy    Escitalopram Other (See Comments)     hallucinations       Past Medical History, Past Surgical History, Family history, Social History, and Medications were all reviewed with the patient today and updated as necessary.      Compliant with medication: Yes   Side effects from medications:  No     EXAMINATION  Musculoskeletal    GAIT AND STATION   [] WNL   [] RESTRICTED   [] UNSTEADY WALK        [] ABNORMAL   [] UNBALANCED         PSYCHIATRIC     GENERAL APPEARANCE:   []  WELL GROOMED []     DISHEVELED   []  UNKEMPT      []  UNUSUAL/BIZZARE    [] WNL       ATTITUDE:   [x] COOPERATIVE   [] GUARDED   [] SUSPICIOUS      [] HOSTILE                            BEHAVIOR:   [x] CALM   [] HYPERACTIVE   [] MANNERISMS      [] BIZZARE         SPEECH:   [x] NORMAL FOR CLIENT   [] SPONTANEOUS   [] SLURRED   [] WHISPERING      [] LOUD   [] PRESSURED   [] ARTICULATE       EYE CONTACT:   [] WNL   [] BLANK STARE   [] INTENSE      [] AVOIDANT         MOOD:   [] EUTHYMIC   [x] ANXIOUS   [x] DEPRESSED      [] IRRITABLE   [] ANGRY   [] APATHETIC     AFFECT:   [] CONGRUENT WITH MOOD   [] FLAT   [] CONSTRICTED      [] INAPPROPRIATE   [] LABILE           THOUGHT PROCESS:   [x] LOGICAL/GOAL-DIRECTED   [] FOI   [] CIRCUMSANTIAL      [] INCOHERENT   [] TANGENTIAL   [] CONCRETE      [] PERSEVERATION           THOUGHT CONTENT:                DELUSIONS  [x] DENIES  [] GRANDIOSE  [] PERSECUTORY  [] Lutheran  [] REFERENCE   HALLUCINATIONS  [x] DENIES  [] AUDITORY  [] VISUAL  [] OLFACTORY  [] TACTILE     [] GUSTATORY  [] SOMATIC         OBSESSIONS  [x] DENIES  [] PRESENT         SUICIDAL IDEATION  [x] DENIES  [] PRESENT W/O PLAN  [] PRESENT W/ PLAN       HOMICIDAL IDEATION  [x] DENIES  [] PRESENT W/O PLAN  [] PRESENT W/ PLAN           JUDGEMENT:   [x] GOOD   [] FAIR   [] POOR   INSIGHT:   [x] GOOD   [] FAIR   [] POOR     COGNITION:           SENSORIUM:   [x] ALERT   [] CLOUDED   [] DROWSY     ORIENTATION:   [x] INTACT   [] TIME:  PLACE  PERSON   RECENT & REMOTE MEMORY:   [] NORMAL   [x] OTHER:                  ATTENTION:   [] INTACT   [x] MILD IMPAIRMENT   [] SEVERE IMPAIRMENT     CONCENTRATION:   [] INTACT   [x] MILD IMPAIRMENT   [] SEVERE IMPAIRMENT     LANGUAGE:   [x] AVERAGE   [] ABOVE AVERAGE   [] BELOW AVERAGE     FUND OF KNOWLEDGE:   [] UNABLE TO ASSESS AT THIS TIME   [x] AVERAGE   [] ABOVE AVERAGE   [] BELOW AVERAGE      [] GOOD TO EXCELLENT KNOWLEDGE OF CURRENT EVENTS   [] POOR TO NO KNLEDGE OF CURRENT EVENTS           ABNORMAL MOVEMENTS:   [] NONE   [] TICS   [] TREMORS   [] BIZZARE      [] FACE   [] TRUNK   [] EXTREMETIES   [] GESTURES        SLEEP:   [x] GOOD   [] FAIR   [] POOR      MUSCLE STRENGTH AND TONE   [] WNL   [] ATROPHY   [] SPASTIC        [] FLACCID   [] COGWHEEL         Diagnoses/Impressions:    ICD-10-CM    1. Bipolar affective disorder, currently depressed, mild ([facility]  F31.31       2. Generalized anxiety disorder  F41.1 clonazePAM (KLONOPIN) 0.5 MG tablet      3. Insomnia due to mental disorder  F51.05       4. Post-traumatic stress disorder, unspecified  F43.10       5.  Borderline personality disorder ([facility] F60.3           TREATMENT GOALS:  Symptom reduction, Medication adherence, maintain therapeutic gains    LABS/IMAGING:    []  Ordered [x]  Reviewed []  New Labs Ordered:     LAB  WBC   Date/Time Value Ref Range Status   07/14/2022 02:10 PM 8.9 4.3 - 11.1 K/uL Final     Hemoglobin   Date/Time Value Ref Range Status   07/14/2022 02:10 PM 12.7 11.7 - 15.4 g/dL Final     Hematocrit   Date/Time Value Ref Range Status   07/14/2022 02:10 PM 40.9 35.8 - 46.3 % Final     Platelets   Date/Time Value Ref Range Status   07/14/2022 02:10  150 - 450 K/uL Final     Sodium   Date/Time Value Ref Range Status   07/14/2022 02:10  136 - 145 mmol/L Final     Potassium   Date/Time Value Ref Range Status   07/14/2022 02:10 PM 4.0 3.5 - 5.1 mmol/L Final     Chloride   Date/Time Value Ref Range Status   07/14/2022 02:10  98 - 107 mmol/L Final     CO2   Date/Time Value Ref Range Status   07/14/2022 02:10 PM 27 21 - 32 mmol/L Final     BUN   Date/Time Value Ref Range Status   07/14/2022 02:10 PM 5 (L) 6 - 23 MG/DL Final     Magnesium   Date/Time Value Ref Range Status   03/23/2021 10:29 AM 1.9 1.6 - 2.3 mg/dL Final     ALT   Date/Time Value Ref Range Status   07/14/2022 02:10 PM 18 12 - 65 U/L Final     AST   Date/Time Value Ref Range Status   07/14/2022 02:10 PM 16 15 - 37 U/L Final     TSH   Date/Time Value Ref Range Status   11/15/2021 11:28 AM 3.880 0.450 - 4.500 uIU/mL Final       Please refer to the lab tab in the epic and care everywhere for the most recent lab results. Plan:     [x]  Medication ordered: Lamotrigine, Klonopin, Effexor to target depression, anxiety, insomnia, mood stabilization. [x]  Medication education/counseling provided  Medication dosage and time to take, purpose/expected benefits/risks, common side effects, lab monitoring required and reason, expected length of treatment, risk of no treatment, effects on pregnancy/nursing, financial availability.  Educated patient on  side effects/risks/benefits of meds including cardiac arrhythmias, suicidal ideations, orthostatic hypotension, serotonin syndrome, risk of elizabeth/hypomania from antidepressants, withdrawals from abrupt discontinuation of meds, risk of rash, risk of infection, risk of bleeding, risk of seizures, addiction potential, memory impairment with long term use of benzos, respiratory depression, high blood pressure, dizziness, drowsiness, sedation , risk of falls, Risk & benefits discussed: including but not limited to possible off-label medication usage. [x] Follow MSE for sxs improvement     I have reviewed the patients controlled substance prescription history, as maintained in the [place] prescription monitoring program, so that the prescription(s) for a  controlled substance can be given. Recommendations and Referrals: Follow up with : MD, requires monitoring of response to medication, requires monitoring of medication side effects. Time until next PMA:     Follow up with Mental Health Clinicians: psychotherapy interventions, improve level of functioning, monitoring to prevent decompensation /hospitalization, monitoring to maintain therapeutic gains, symptoms (resolving and controlled)           Psychotherapy note:                                __10_ Minutes of psychotherapy     [x]  Supportive psychotherapy, Patient discussed certain situational and personal stressors ongoing in her life at this time, weight management d/w the patient. Sleep hygiene d/w patient. Patient allowed to vent out her emotions. Scenarios were reviewed using role playing and CBT techniques in order to increase insight and decrease anxiety. []  Disposition planning      []  Dangerous and will not contract for safety in the community    **[employer]int has been notified: They are to call 911 or go to their nearest E.R. if they are experiencing a medical emergency or suicidal ideations/homicidal ideations. **  All ancillary

## 2022-12-07 ENCOUNTER — TELEMEDICINE (OUTPATIENT)
Dept: BEHAVIORAL/MENTAL HEALTH CLINIC | Age: 37
End: 2022-12-07
Payer: COMMERCIAL

## 2022-12-07 DIAGNOSIS — F60.3 BORDERLINE PERSONALITY DISORDER (HCC): ICD-10-CM

## 2022-12-07 DIAGNOSIS — F43.10 POST-TRAUMATIC STRESS DISORDER, UNSPECIFIED: ICD-10-CM

## 2022-12-07 DIAGNOSIS — F51.05 INSOMNIA DUE TO MENTAL DISORDER: ICD-10-CM

## 2022-12-07 DIAGNOSIS — F31.31 BIPOLAR AFFECTIVE DISORDER, CURRENTLY DEPRESSED, MILD (HCC): Primary | ICD-10-CM

## 2022-12-07 DIAGNOSIS — F41.1 GENERALIZED ANXIETY DISORDER: ICD-10-CM

## 2022-12-07 PROCEDURE — 99214 OFFICE O/P EST MOD 30 MIN: CPT | Performed by: PSYCHIATRY & NEUROLOGY

## 2022-12-07 RX ORDER — ZOLPIDEM TARTRATE 10 MG/1
10 TABLET ORAL NIGHTLY PRN
Qty: 30 TABLET | Refills: 3 | Status: SHIPPED | OUTPATIENT
Start: 2022-12-07 | End: 2023-04-06

## 2022-12-07 RX ORDER — VENLAFAXINE HYDROCHLORIDE 150 MG/1
150 CAPSULE, EXTENDED RELEASE ORAL DAILY
Qty: 30 CAPSULE | Refills: 3 | Status: SHIPPED | OUTPATIENT
Start: 2022-12-07

## 2022-12-07 RX ORDER — CLONAZEPAM 0.5 MG/1
0.5 TABLET ORAL 2 TIMES DAILY PRN
Qty: 60 TABLET | Refills: 3 | Status: SHIPPED | OUTPATIENT
Start: 2022-12-07 | End: 2023-04-06

## 2022-12-07 RX ORDER — LAMOTRIGINE 150 MG/1
150 TABLET ORAL 2 TIMES DAILY
Qty: 60 TABLET | Refills: 3 | Status: SHIPPED | OUTPATIENT
Start: 2022-12-07

## 2022-12-07 ASSESSMENT — ANXIETY QUESTIONNAIRES
3. WORRYING TOO MUCH ABOUT DIFFERENT THINGS: 1
GAD7 TOTAL SCORE: 6
5. BEING SO RESTLESS THAT IT IS HARD TO SIT STILL: 1
2. NOT BEING ABLE TO STOP OR CONTROL WORRYING: 1
7. FEELING AFRAID AS IF SOMETHING AWFUL MIGHT HAPPEN: 0
6. BECOMING EASILY ANNOYED OR IRRITABLE: 1
1. FEELING NERVOUS, ANXIOUS, OR ON EDGE: 1
4. TROUBLE RELAXING: 1
IF YOU CHECKED OFF ANY PROBLEMS ON THIS QUESTIONNAIRE, HOW DIFFICULT HAVE THESE PROBLEMS MADE IT FOR YOU TO DO YOUR WORK, TAKE CARE OF THINGS AT HOME, OR GET ALONG WITH OTHER PEOPLE: SOMEWHAT DIFFICULT

## 2022-12-07 ASSESSMENT — PATIENT HEALTH QUESTIONNAIRE - PHQ9
3. TROUBLE FALLING OR STAYING ASLEEP: 2
SUM OF ALL RESPONSES TO PHQ QUESTIONS 1-9: 9
9. THOUGHTS THAT YOU WOULD BE BETTER OFF DEAD, OR OF HURTING YOURSELF: 0
4. FEELING TIRED OR HAVING LITTLE ENERGY: 1
10. IF YOU CHECKED OFF ANY PROBLEMS, HOW DIFFICULT HAVE THESE PROBLEMS MADE IT FOR YOU TO DO YOUR WORK, TAKE CARE OF THINGS AT HOME, OR GET ALONG WITH OTHER PEOPLE: 1
7. TROUBLE CONCENTRATING ON THINGS, SUCH AS READING THE NEWSPAPER OR WATCHING TELEVISION: 1
SUM OF ALL RESPONSES TO PHQ9 QUESTIONS 1 & 2: 2
SUM OF ALL RESPONSES TO PHQ QUESTIONS 1-9: 9
1. LITTLE INTEREST OR PLEASURE IN DOING THINGS: 1
SUM OF ALL RESPONSES TO PHQ QUESTIONS 1-9: 9
2. FEELING DOWN, DEPRESSED OR HOPELESS: 1
5. POOR APPETITE OR OVEREATING: 1
8. MOVING OR SPEAKING SO SLOWLY THAT OTHER PEOPLE COULD HAVE NOTICED. OR THE OPPOSITE, BEING SO FIGETY OR RESTLESS THAT YOU HAVE BEEN MOVING AROUND A LOT MORE THAN USUAL: 1
6. FEELING BAD ABOUT YOURSELF - OR THAT YOU ARE A FAILURE OR HAVE LET YOURSELF OR YOUR FAMILY DOWN: 1
SUM OF ALL RESPONSES TO PHQ QUESTIONS 1-9: 9

## 2022-12-07 NOTE — PROGRESS NOTES
Patient:  Amy Rios  Age:  30 y.o.  :  1985     SEX:  female MRN:  [id number]     RACE: White (non-)     SEEN:  [x]  PATIENT  []  SPOUSE []  OTHER:              PHQ-9  2022   Little interest or pleasure in doing things 1 1 1   Little interest or pleasure in doing things - - -   Feeling down, depressed, or hopeless 1 1 1   Trouble falling or staying asleep, or sleeping too much 2 1 -   Trouble falling or staying asleep, or sleeping too much - - -   Feeling tired or having little energy 1 1 -   Feeling tired or having little energy - - -   Poor appetite or overeating 1 3 -   Poor appetite, weight loss, or overeating - - -   Feeling bad about yourself - or that you are a failure or have let yourself or your family down 1 1 -   Feeling bad about yourself - or that you are a failure or have let yourself or your family down - - -   Trouble concentrating on things, such as reading the newspaper or watching television 1 1 -   Trouble concentrating on things such as school, work, reading, or watching TV - - -   Moving or speaking so slowly that other people could have noticed. Or the opposite - being so fidgety or restless that you have been moving around a lot more than usual 1 0 -   Moving or speaking so slowly that other people could have noticed; or the opposite being so fidgety that others notice - - -   Thoughts that you would be better off dead, or of hurting yourself in some way 0 0 -   Thoughts of being better off dead, or hurting yourself in some way - - -   PHQ-2 Score 2 2 2   Total Score PHQ 2 - - -   PHQ-9 Total Score 9 9 2   PHQ 9 Score - - -   If you checked off any problems, how difficult have these problems made it for you to do your work, take care of things at home, or get along with other people?  1 1 -   How difficult have these problems made it for you to do your work, take care of your home and get along with others - - -       NIKI-7 SCREENING 12/7/2022 9/13/2022 6/21/2022   Feeling nervous, anxious, or on edge Several days Several days Several days   Not being able to stop or control worrying Several days Not at all Several days   Worrying too much about different things Several days More than half the days Several days   Trouble relaxing Several days Several days Several days   Being so restless that it is hard to sit still Several days Not at all Not at all   Becoming easily annoyed or irritable Several days Several days Several days   Feeling afraid as if something awful might happen Not at all Not at all Not at all   NIKI-7 Total Score 6 5 5   How difficult have these problems made it for you to do your work, take care of things at home, or get along with other people? Somewhat difficult Somewhat difficult Somewhat difficult   Feeling nervous, anxious, or on edge - - -   NIKI-7 Total Score - - -        I was at home while conducting this encounter. Consent:  She and/or her healthcare decision maker is aware that this patient-initiated Telehealth encounter is a billable service, with coverage as determined by her insurance carrier. She is aware that she may receive a bill and has provided verbal consent to proceed: YesPatient identification was verified, and a caregiver was present when appropriate. The patient was located in a state where the provider was credentialed to provide care. This virtual visit was conducted via [address] Pursuant to the emergency declaration under the [facility], [facility] waiver authority and the [employer] General Act, this Virtual  Visit was conducted to reduce the patient's risk of exposure to COVID-19 and provide continuity of care for an established patient. Services were provided through a video synchronous discussion virtually to substitute for in-person clinic visit.   Due to this being a TeleHealth evaluation, many elements of the physical examination are unable to be assessed. Total Time: minutes: 11-20 minutes. Chief complaint:  Pt says she is doing okay. Subjective:  Seen virtually for follow-up. States doing okay. Patient riding in the car with her mother. Mother is driving. States they are going to get her oldest daughter from school. Oldest daughter had and of course testing today. She is in . States her oldest daughter is smarter than her and she is proud of her. States she has been more stressed last month has been working more. Trying to get shared custody of her girls with her mother. 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 Trying to buy a car so she does not have problem with transportation. Has been living with her parents for the whole last week. Planning to go home tomorrow. Supportive psychotherapy provided. She denies suicidal ideation/homicidal ideations. Denies symptoms psychosis. Patient Active Problem List   Diagnosis    Other hemorrhoids    Gastroesophageal reflux disease without esophagitis    Bipolar disorder ([facility]    Gestational hypertension    Chronic post-traumatic stress disorder (PTSD)    Poor dentition    Borderline personality disorder in remission ([facility]    Endometriosis    Morbid obesity ([facility]    Hashimoto's thyroiditis    Fatty liver    Adenomyosis of uterus    Urinary frequency    Urinary urgency    Recurrent UTI    Chronic migraine without aura without status migrainosus, not intractable    Mixed hyperlipidemia    Overactive bladder     LMP 11/8/22, Birth control,   Not pregnant    Denies palpitation,SOB, Chest pain, headaches. In no acute distress. MEDICATION REVIEW:    Current Medications:    Current Outpatient Medications   Medication Sig    clonazePAM (KLONOPIN) 0.5 MG tablet Take 1 tablet by mouth 2 times daily as needed for Anxiety for up to 120 days.     lamoTRIgine (LAMICTAL) 150 MG tablet Take 1 tablet by mouth 2 times daily    venlafaxine (EFFEXOR XR) 150 MG extended release capsule Take 1 capsule by mouth daily    zolpidem (AMBIEN) 10 MG tablet Take 1 tablet by mouth nightly as needed for Sleep for up to 120 days. levothyroxine (SYNTHROID) 150 MCG tablet TAKE 1 TABLET BY MOUTH EVERY DAY BEFORE BREAKFAST    levonorgestrel (MIRENA) IUD 52 mg 1 Device by IntraUTERine route once    loratadine (CLARITIN) 10 MG tablet Take 10 mg by mouth daily as needed    solifenacin (VESICARE) 5 MG tablet TAKE 1 TABLET BY MOUTH EVERY DAY    albuterol sulfate  (90 Base) MCG/ACT inhaler Inhale 2 puffs into the lungs every 4 hours as needed    pantoprazole (PROTONIX) 40 MG tablet TAKE 1 TABLET BY MOUTH EVERY DAY    acyclovir (ZOVIRAX) 400 MG tablet TAKE 1 TABLET BY MOUTH THREE TIMES A DAY FOR 7 DAYS    doxycycline hyclate (VIBRA-TABS) 100 MG tablet TAKE 1 TABLET BY MOUTH EVERY 12 HOURS FOR 10 DAYS    fesoterodine (TOVIAZ) 4 MG TB24 ER tablet Take 4 mg by mouth daily (Patient not taking: Reported on 12/7/2022)     No current facility-administered medications for this visit. Allergies   Allergen Reactions    Adhesive Tape Rash     Other reaction(s): Rash-Allergy    Escitalopram Other (See Comments)     hallucinations       Past Medical History, Past Surgical History, Family history, Social History, and Medications were all reviewed with the patient today and updated as necessary.      Compliant with medication: Yes   Side effects from medications:  No     EXAMINATION  Musculoskeletal    GAIT AND STATION   [x] WNL   [] RESTRICTED   [] UNSTEADY WALK        [] ABNORMAL   [] UNBALANCED         PSYCHIATRIC     GENERAL APPEARANCE:   []  WELL GROOMED []     DISHEVELED   []  UNKEMPT      []  UNUSUAL/BIZZARE    [x] WNL       ATTITUDE:   [x] COOPERATIVE   [] GUARDED   [] SUSPICIOUS      [] HOSTILE                            BEHAVIOR:   [x] CALM   [] HYPERACTIVE   [] MANNERISMS      [] BIZZARE         SPEECH:   [x] NORMAL FOR CLIENT   [] SPONTANEOUS   [] SLURRED   [] WHISPERING [] LOUD   [] PRESSURED   [] ARTICULATE       EYE CONTACT:   [x] WNL   [] BLANK STARE   [] INTENSE      [] AVOIDANT         MOOD:   [] EUTHYMIC   [x] ANXIOUS   [] DEPRESSED      [] IRRITABLE   [] ANGRY   [] APATHETIC     AFFECT:   [x] CONGRUENT WITH MOOD   [] FLAT   [] CONSTRICTED      [] INAPPROPRIATE   [] LABILE           THOUGHT PROCESS:   [x] LOGICAL/GOAL-DIRECTED   [] FOI   [] CIRCUMSANTIAL      [] INCOHERENT   [] TANGENTIAL   [] CONCRETE      [] PERSEVERATION           THOUGHT CONTENT:                DELUSIONS  [x] DENIES  [] GRANDIOSE  [] PERSECUTORY  [] Roman Catholic  [] REFERENCE   HALLUCINATIONS  [x] DENIES  [] AUDITORY  [] VISUAL  [] OLFACTORY  [] TACTILE     [] GUSTATORY  [] SOMATIC         OBSESSIONS  [x] DENIES  [] PRESENT         SUICIDAL IDEATION  [x] DENIES  [] PRESENT W/O PLAN  [] PRESENT W/ PLAN       HOMICIDAL IDEATION  [x] DENIES  [] PRESENT W/O PLAN  [] PRESENT W/ PLAN           JUDGEMENT:   [x] GOOD   [] FAIR   [] POOR   INSIGHT:   [x] GOOD   [] FAIR   [] POOR     COGNITION:           SENSORIUM:   [x] ALERT   [] CLOUDED   [] DROWSY     ORIENTATION:   [x] INTACT   [] TIME:  PLACE  PERSON   RECENT & REMOTE MEMORY:   [] NORMAL   [x] OTHER:                  ATTENTION:   [] INTACT   [x] MILD IMPAIRMENT   [] SEVERE IMPAIRMENT     CONCENTRATION:   [] INTACT   [x] MILD IMPAIRMENT   [] SEVERE IMPAIRMENT     LANGUAGE:   [x] AVERAGE   [] ABOVE AVERAGE   [] BELOW AVERAGE     FUND OF KNOWLEDGE:   [] UNABLE TO ASSESS AT THIS TIME   [x] AVERAGE   [] ABOVE AVERAGE   [] BELOW AVERAGE      [] GOOD TO EXCELLENT KNOWLEDGE OF CURRENT EVENTS   [] POOR TO NO KNLEDGE OF CURRENT EVENTS           ABNORMAL MOVEMENTS:   [x] NONE   [] TICS   [] TREMORS   [] BIZZARE      [] FACE   [] TRUNK   [] EXTREMETIES   [] GESTURES        SLEEP:   [] GOOD   [] FAIR   [x] POOR      MUSCLE STRENGTH AND TONE   [] WNL   [] ATROPHY   [] SPASTIC        [] FLACCID   [] COGWHEEL         Diagnoses/Impressions:    ICD-10-CM    1.  Bipolar affective disorder, currently depressed, mild ([facility]  F31.31       2. Generalized anxiety disorder  F41.1 clonazePAM (KLONOPIN) 0.5 MG tablet      3. Insomnia due to mental disorder  F51.05 zolpidem (AMBIEN) 10 MG tablet      4. Post-traumatic stress disorder, unspecified  F43.10       5. Borderline personality disorder ([facility]  F60.3           TREATMENT GOALS:  Symptom reduction, Medication adherence, maintain therapeutic gains    LABS/IMAGING:    []  Ordered [x]  Reviewed []  New Labs Ordered:     LAB  WBC   Date/Time Value Ref Range Status   07/14/2022 02:10 PM 8.9 4.3 - 11.1 K/uL Final     Hemoglobin   Date/Time Value Ref Range Status   07/14/2022 02:10 PM 12.7 11.7 - 15.4 g/dL Final     Hematocrit   Date/Time Value Ref Range Status   07/14/2022 02:10 PM 40.9 35.8 - 46.3 % Final     Platelets   Date/Time Value Ref Range Status   07/14/2022 02:10  150 - 450 K/uL Final     Sodium   Date/Time Value Ref Range Status   07/14/2022 02:10  136 - 145 mmol/L Final     Potassium   Date/Time Value Ref Range Status   07/14/2022 02:10 PM 4.0 3.5 - 5.1 mmol/L Final     Chloride   Date/Time Value Ref Range Status   07/14/2022 02:10  98 - 107 mmol/L Final     CO2   Date/Time Value Ref Range Status   07/14/2022 02:10 PM 27 21 - 32 mmol/L Final     BUN   Date/Time Value Ref Range Status   07/14/2022 02:10 PM 5 (L) 6 - 23 MG/DL Final     Magnesium   Date/Time Value Ref Range Status   03/23/2021 10:29 AM 1.9 1.6 - 2.3 mg/dL Final     ALT   Date/Time Value Ref Range Status   07/14/2022 02:10 PM 18 12 - 65 U/L Final     AST   Date/Time Value Ref Range Status   07/14/2022 02:10 PM 16 15 - 37 U/L Final     TSH   Date/Time Value Ref Range Status   11/15/2021 11:28 AM 3.880 0.450 - 4.500 uIU/mL Final       Please refer to the lab tab in the epic and care everywhere for the most recent lab results.     Plan:     [x]  Medication ordered: Lamictal, Effexor, Klonopin, Ambien to target depression, anxiety, insomnia, mood stabilization. [x]  Medication education/counseling provided  Medication dosage and time to take, purpose/expected benefits/risks, common side effects, lab monitoring required and reason, expected length of treatment, risk of no treatment, effects on pregnancy/nursing, financial availability. Educated patient on  side effects/risks/benefits of meds including cardiac arrhythmias, suicidal ideations, orthostatic hypotension, serotonin syndrome, risk of elizabeth/hypomania from antidepressants, withdrawals from abrupt discontinuation of meds, risk of rash, risk of infection, risk of bleeding, risk of seizures, addiction potential, memory impairment with long term use of benzos, respiratory depression, complex sleep behaviors on Ambien including sleepwalking, driving while asleep, making phone calls while asleep, preparing and eating food while asleep, high blood pressure, dizziness, drowsiness, sedation , risk of falls, Risk & benefits discussed: including but not limited to possible off-label medication usage. [x] Follow MSE for sxs improvement     I have reviewed the patients controlled substance prescription history, as maintained in the [facility] prescription monitoring program, so that the prescription(s) for a  controlled substance can be given. Recommendations and Referrals: Follow up with : MD, requires monitoring of response to medication, requires monitoring of medication side effects.     Time until next PMA:     Follow up with Mental Health Clinicians: psychotherapy interventions, improve level of functioning, monitoring to prevent decompensation /hospitalization, monitoring to maintain therapeutic gains, symptoms (resolving and controlled)           Psychotherapy note:                                __10_ Minutes of psychotherapy     [x]  Supportive psychotherapy, Patient discussed certain situational and personal stressors ongoing in her life at this time, weight management d/w the patient. Sleep hygiene d/w patient. Patient allowed to vent out her emotions. Scenarios were reviewed using role playing and CBT techniques in order to increase insight and decrease anxiety. []  Disposition planning      []  Dangerous and will not contract for safety in the community    **Pateint has been notified: They are to call 911 or go to their nearest E.R. if they are experiencing a medical emergency or suicidal ideations/homicidal ideations. **  All ancillary documentation entered reviewed by provider. PLEASE NOTE:  This document has been produced in part or whole using voice recognition software. Proofread however unrecognized errors in transcription may be present.         Unknown MD Shamika

## 2023-01-03 RX ORDER — PANTOPRAZOLE SODIUM 40 MG/1
TABLET, DELAYED RELEASE ORAL
Qty: 30 TABLET | Refills: 7 | OUTPATIENT
Start: 2023-01-03

## 2023-01-18 ENCOUNTER — OFFICE VISIT (OUTPATIENT)
Dept: FAMILY MEDICINE CLINIC | Facility: CLINIC | Age: 38
End: 2023-01-18
Payer: COMMERCIAL

## 2023-01-18 VITALS
HEIGHT: 63 IN | TEMPERATURE: 98.2 F | BODY MASS INDEX: 45 KG/M2 | HEART RATE: 92 BPM | SYSTOLIC BLOOD PRESSURE: 102 MMHG | OXYGEN SATURATION: 98 % | DIASTOLIC BLOOD PRESSURE: 76 MMHG | WEIGHT: 254 LBS

## 2023-01-18 DIAGNOSIS — F43.12 CHRONIC POST-TRAUMATIC STRESS DISORDER (PTSD): ICD-10-CM

## 2023-01-18 DIAGNOSIS — G43.709 CHRONIC MIGRAINE WITHOUT AURA WITHOUT STATUS MIGRAINOSUS, NOT INTRACTABLE: ICD-10-CM

## 2023-01-18 DIAGNOSIS — F60.3: ICD-10-CM

## 2023-01-18 DIAGNOSIS — M25.561 CHRONIC PAIN OF BOTH KNEES: ICD-10-CM

## 2023-01-18 DIAGNOSIS — G89.29 CHRONIC RIGHT SHOULDER PAIN: ICD-10-CM

## 2023-01-18 DIAGNOSIS — M25.562 CHRONIC PAIN OF BOTH KNEES: ICD-10-CM

## 2023-01-18 DIAGNOSIS — E06.3 HASHIMOTO'S THYROIDITIS: ICD-10-CM

## 2023-01-18 DIAGNOSIS — K76.0 FATTY LIVER: Primary | ICD-10-CM

## 2023-01-18 DIAGNOSIS — M25.511 CHRONIC RIGHT SHOULDER PAIN: ICD-10-CM

## 2023-01-18 DIAGNOSIS — F31.31 BIPOLAR AFFECTIVE DISORDER, CURRENTLY DEPRESSED, MILD (HCC): ICD-10-CM

## 2023-01-18 DIAGNOSIS — K21.9 GASTROESOPHAGEAL REFLUX DISEASE WITHOUT ESOPHAGITIS: ICD-10-CM

## 2023-01-18 DIAGNOSIS — R53.83 FATIGUE, UNSPECIFIED TYPE: ICD-10-CM

## 2023-01-18 DIAGNOSIS — E78.2 MIXED HYPERLIPIDEMIA: ICD-10-CM

## 2023-01-18 DIAGNOSIS — N32.81 OVERACTIVE BLADDER: ICD-10-CM

## 2023-01-18 DIAGNOSIS — G89.29 CHRONIC PAIN OF BOTH KNEES: ICD-10-CM

## 2023-01-18 DIAGNOSIS — L65.9 HAIR LOSS: ICD-10-CM

## 2023-01-18 DIAGNOSIS — E66.01 MORBID OBESITY (HCC): ICD-10-CM

## 2023-01-18 DIAGNOSIS — R39.9 LOWER URINARY TRACT SYMPTOMS (LUTS): ICD-10-CM

## 2023-01-18 DIAGNOSIS — K76.0 FATTY LIVER: ICD-10-CM

## 2023-01-18 LAB
25(OH)D3 SERPL-MCNC: 10.6 NG/ML (ref 30–100)
ALBUMIN SERPL-MCNC: 3.3 G/DL (ref 3.5–5)
ALBUMIN/GLOB SERPL: 1.3 (ref 0.4–1.6)
ALP SERPL-CCNC: 79 U/L (ref 50–136)
ALT SERPL-CCNC: 22 U/L (ref 12–65)
ANION GAP SERPL CALC-SCNC: 7 MMOL/L (ref 2–11)
AST SERPL-CCNC: 12 U/L (ref 15–37)
BILIRUB SERPL-MCNC: 0.3 MG/DL (ref 0.2–1.1)
BILIRUBIN, URINE, POC: NEGATIVE
BLOOD URINE, POC: NORMAL
BUN SERPL-MCNC: 5 MG/DL (ref 6–23)
CALCIUM SERPL-MCNC: 9.8 MG/DL (ref 8.3–10.4)
CHLORIDE SERPL-SCNC: 107 MMOL/L (ref 101–110)
CHOLEST SERPL-MCNC: 229 MG/DL
CO2 SERPL-SCNC: 27 MMOL/L (ref 21–32)
CREAT SERPL-MCNC: 0.8 MG/DL (ref 0.6–1)
GLOBULIN SER CALC-MCNC: 2.6 G/DL (ref 2.8–4.5)
GLUCOSE SERPL-MCNC: 82 MG/DL (ref 65–100)
GLUCOSE URINE, POC: NEGATIVE
HDLC SERPL-MCNC: 54 MG/DL (ref 40–60)
HDLC SERPL: 4.2
KETONES, URINE, POC: NEGATIVE
LDLC SERPL CALC-MCNC: 152.4 MG/DL
LEUKOCYTE ESTERASE, URINE, POC: NORMAL
NITRITE, URINE, POC: NEGATIVE
PH, URINE, POC: 6 (ref 4.6–8)
POTASSIUM SERPL-SCNC: 4.6 MMOL/L (ref 3.5–5.1)
PROT SERPL-MCNC: 5.9 G/DL (ref 6.3–8.2)
PROTEIN,URINE, POC: NEGATIVE
SODIUM SERPL-SCNC: 141 MMOL/L (ref 133–143)
SPECIFIC GRAVITY, URINE, POC: 1.02 (ref 1–1.03)
TRIGL SERPL-MCNC: 113 MG/DL (ref 35–150)
TSH, 3RD GENERATION: 12 UIU/ML (ref 0.36–3.74)
URINALYSIS CLARITY, POC: NORMAL
URINALYSIS COLOR, POC: NORMAL
UROBILINOGEN, POC: 0.2
VLDLC SERPL CALC-MCNC: 22.6 MG/DL (ref 6–23)

## 2023-01-18 PROCEDURE — 81003 URINALYSIS AUTO W/O SCOPE: CPT | Performed by: NURSE PRACTITIONER

## 2023-01-18 PROCEDURE — 99214 OFFICE O/P EST MOD 30 MIN: CPT | Performed by: NURSE PRACTITIONER

## 2023-01-18 RX ORDER — LEVOTHYROXINE SODIUM 0.15 MG/1
TABLET ORAL
Qty: 30 TABLET | Refills: 5 | Status: SHIPPED | OUTPATIENT
Start: 2023-01-18

## 2023-01-18 ASSESSMENT — ENCOUNTER SYMPTOMS
GASTROINTESTINAL NEGATIVE: 1
RESPIRATORY NEGATIVE: 1
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
## 2023-01-21 LAB
BACTERIA SPEC CULT: NORMAL
SERVICE CMNT-IMP: NORMAL

## 2023-01-24 ENCOUNTER — TELEPHONE (OUTPATIENT)
Dept: FAMILY MEDICINE CLINIC | Facility: CLINIC | Age: 38
End: 2023-01-24

## 2023-01-24 DIAGNOSIS — E55.9 VITAMIN D DEFICIENCY: ICD-10-CM

## 2023-01-24 DIAGNOSIS — E03.8 HYPOTHYROIDISM DUE TO HASHIMOTO'S THYROIDITIS: Primary | ICD-10-CM

## 2023-01-24 DIAGNOSIS — E06.3 HYPOTHYROIDISM DUE TO HASHIMOTO'S THYROIDITIS: Primary | ICD-10-CM

## 2023-01-24 RX ORDER — ERGOCALCIFEROL 1.25 MG/1
50000 CAPSULE ORAL WEEKLY
Qty: 12 CAPSULE | Refills: 0 | Status: SHIPPED | OUTPATIENT
Start: 2023-01-24

## 2023-01-24 NOTE — TELEPHONE ENCOUNTER
----- Message from PEÑA Oconnor CNP sent at 1/24/2023  4:57 AM EST -----  Patient's vitamin D is low at 10.6. I will send in a prescription for vitamin D 50,000 units to take once a week for 12 weeks, then she will need to take an over the counter supplement 1000 units daily starting on week 13. Her cholesterol is elevated at 229 and LDL or bad cholesterol is 152. Avoid fried, fast, or processed foods and foods high in saturated fat/cholesterol. Consume dietary fiber 20-30 grams daily. Exercise 150 minutes/week. With her family hx of heart disease, may need to consider starting a cholesterol lowering statin medication. Let us recheck her cholesterol (fasting lab) in 3 months after she has initiated these lifestyle changes. Her TSH has significantly risen to 12. We will need to recheck her lab in 8 weeks. Has she missed many doses of her levothyroxine? Please message me with her response, we will need to adjust her medication if she has. Please let me know if patient has any further questions or concerns.

## 2023-01-24 NOTE — TELEPHONE ENCOUNTER
Per provider:   No changes to her levothyroxine dose. Please have her f/u for lab visit only in 8 weeks. Called patient, notifying her of above. She verbalized understanding & thanked.

## 2023-01-24 NOTE — RESULT ENCOUNTER NOTE
Patient's vitamin D is low at 10.6. I will send in a prescription for vitamin D 50,000 units to take once a week for 12 weeks, then she will need to take an over the counter supplement 1000 units daily starting on week 13. Her cholesterol is elevated at 229 and LDL or bad cholesterol is 152. Avoid fried, fast, or processed foods and foods high in saturated fat/cholesterol. Consume dietary fiber 20-30 grams daily. Exercise 150 minutes/week. With her family hx of heart disease, may need to consider starting a cholesterol lowering statin medication. Let us recheck her cholesterol (fasting lab) in 3 months after she has initiated these lifestyle changes. Her TSH has significantly risen to 12. We will need to recheck her lab in 8 weeks. Has she missed many doses of her levothyroxine? Please message me with her response, we will need to adjust her medication if she has. Please let me know if patient has any further questions or concerns.

## 2023-01-24 NOTE — TELEPHONE ENCOUNTER
Patient returned call. Notified of lab results & provider instructions. Patient verbalized understanding re: Vit D supplement Q7days x12 weeks then OTC dose daily thereafter. She states that she will  from the pharmacy & begin this week. Regarding TSH, she states that she ran out of her Levothyroxine about a week prior to labs, so this may be why it was elevated. She denies needing a refill right now & confirms that she is taking Levothyroxine 150mcg every morning. Patient states that she will will make changes to her diet & increase exercise to help improve cholesterol levels. Repeat lab made for 4/4/23 at 10:30am.   Will notify provider of thyroid medication, as requested.

## 2023-01-26 ENCOUNTER — OFFICE VISIT (OUTPATIENT)
Dept: ORTHOPEDIC SURGERY | Age: 38
End: 2023-01-26

## 2023-01-26 DIAGNOSIS — M25.562 PAIN IN BOTH KNEES, UNSPECIFIED CHRONICITY: Primary | ICD-10-CM

## 2023-01-26 DIAGNOSIS — M25.561 PAIN IN BOTH KNEES, UNSPECIFIED CHRONICITY: Primary | ICD-10-CM

## 2023-01-26 DIAGNOSIS — S83.241A ACUTE MEDIAL MENISCUS TEAR OF RIGHT KNEE, INITIAL ENCOUNTER: ICD-10-CM

## 2023-01-26 DIAGNOSIS — M25.50 POLYARTHRALGIA: ICD-10-CM
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
## 2023-02-20 ENCOUNTER — TELEPHONE (OUTPATIENT)
Dept: ORTHOPEDIC SURGERY | Age: 38
End: 2023-02-20

## 2023-02-20 NOTE — TELEPHONE ENCOUNTER
Cecilio called for a peer to peer and they denied MRI until follow up where there is documentation of PT/HEP and rheum labs.

## 2023-03-22 DIAGNOSIS — M25.50 POLYARTHRALGIA: ICD-10-CM

## 2023-03-22 LAB
BASOPHILS # BLD: 0.1 K/UL (ref 0–0.2)
BASOPHILS NFR BLD: 1 % (ref 0–2)
DIFFERENTIAL METHOD BLD: ABNORMAL
EOSINOPHIL # BLD: 0.2 K/UL (ref 0–0.8)
EOSINOPHIL NFR BLD: 1 % (ref 0.5–7.8)
ERYTHROCYTE [DISTWIDTH] IN BLOOD BY AUTOMATED COUNT: 13.9 % (ref 11.9–14.6)
ERYTHROCYTE [SEDIMENTATION RATE] IN BLOOD: 4 MM/HR (ref 0–20)
HCT VFR BLD AUTO: 46.9 % (ref 35.8–46.3)
HGB BLD-MCNC: 14.9 G/DL (ref 11.7–15.4)
IMM GRANULOCYTES # BLD AUTO: 0.1 K/UL (ref 0–0.5)
IMM GRANULOCYTES NFR BLD AUTO: 0 % (ref 0–5)
LYMPHOCYTES # BLD: 1.6 K/UL (ref 0.5–4.6)
LYMPHOCYTES NFR BLD: 13 % (ref 13–44)
MCH RBC QN AUTO: 27.5 PG (ref 26.1–32.9)
MCHC RBC AUTO-ENTMCNC: 31.8 G/DL (ref 31.4–35)
MCV RBC AUTO: 86.7 FL (ref 82–102)
MONOCYTES # BLD: 1 K/UL (ref 0.1–1.3)
MONOCYTES NFR BLD: 8 % (ref 4–12)
NEUTS SEG # BLD: 9.4 K/UL (ref 1.7–8.2)
NEUTS SEG NFR BLD: 77 % (ref 43–78)
NRBC # BLD: 0 K/UL (ref 0–0.2)
PLATELET # BLD AUTO: 385 K/UL (ref 150–450)
PMV BLD AUTO: 9.8 FL (ref 9.4–12.3)
RBC # BLD AUTO: 5.41 M/UL (ref 4.05–5.2)
WBC # BLD AUTO: 12.3 K/UL (ref 4.3–11.1)

## 2023-03-22 ASSESSMENT — PATIENT HEALTH QUESTIONNAIRE - PHQ9
6. FEELING BAD ABOUT YOURSELF - OR THAT YOU ARE A FAILURE OR HAVE LET YOURSELF OR YOUR FAMILY DOWN: 3
10. IF YOU CHECKED OFF ANY PROBLEMS, HOW DIFFICULT HAVE THESE PROBLEMS MADE IT FOR YOU TO DO YOUR WORK, TAKE CARE OF THINGS AT HOME, OR GET ALONG WITH OTHER PEOPLE: VERY DIFFICULT
10. IF YOU CHECKED OFF ANY PROBLEMS, HOW DIFFICULT HAVE THESE PROBLEMS MADE IT FOR YOU TO DO YOUR WORK, TAKE CARE OF THINGS AT HOME, OR GET ALONG WITH OTHER PEOPLE: 2
SUM OF ALL RESPONSES TO PHQ QUESTIONS 1-9: 16
4. FEELING TIRED OR HAVING LITTLE ENERGY: 2
3. TROUBLE FALLING OR STAYING ASLEEP: SEVERAL DAYS
SUM OF ALL RESPONSES TO PHQ QUESTIONS 1-9: 16
3. TROUBLE FALLING OR STAYING ASLEEP: 1
8. MOVING OR SPEAKING SO SLOWLY THAT OTHER PEOPLE COULD HAVE NOTICED. OR THE OPPOSITE - BEING SO FIDGETY OR RESTLESS THAT YOU HAVE BEEN MOVING AROUND A LOT MORE THAN USUAL: MORE THAN HALF THE DAYS
9. THOUGHTS THAT YOU WOULD BE BETTER OFF DEAD, OR OF HURTING YOURSELF: 0
8. MOVING OR SPEAKING SO SLOWLY THAT OTHER PEOPLE COULD HAVE NOTICED. OR THE OPPOSITE, BEING SO FIGETY OR RESTLESS THAT YOU HAVE BEEN MOVING AROUND A LOT MORE THAN USUAL: 2
2. FEELING DOWN, DEPRESSED OR HOPELESS: NEARLY EVERY DAY
7. TROUBLE CONCENTRATING ON THINGS, SUCH AS READING THE NEWSPAPER OR WATCHING TELEVISION: MORE THAN HALF THE DAYS
SUM OF ALL RESPONSES TO PHQ QUESTIONS 1-9: 16
4. FEELING TIRED OR HAVING LITTLE ENERGY: MORE THAN HALF THE DAYS
6. FEELING BAD ABOUT YOURSELF - OR THAT YOU ARE A FAILURE OR HAVE LET YOURSELF OR YOUR FAMILY DOWN: NEARLY EVERY DAY
SUM OF ALL RESPONSES TO PHQ QUESTIONS 1-9: 16
9. THOUGHTS THAT YOU WOULD BE BETTER OFF DEAD, OR OF HURTING YOURSELF: NOT AT ALL
SUM OF ALL RESPONSES TO PHQ9 QUESTIONS 1 & 2: 5
5. POOR APPETITE OR OVEREATING: SEVERAL DAYS
2. FEELING DOWN, DEPRESSED OR HOPELESS: 3
SUM OF ALL RESPONSES TO PHQ9 QUESTIONS 1 & 2: 5
5. POOR APPETITE OR OVEREATING: 1
SUM OF ALL RESPONSES TO PHQ QUESTIONS 1-9: 16
1. LITTLE INTEREST OR PLEASURE IN DOING THINGS: 2
1. LITTLE INTEREST OR PLEASURE IN DOING THINGS: MORE THAN HALF THE DAYS
7. TROUBLE CONCENTRATING ON THINGS, SUCH AS READING THE NEWSPAPER OR WATCHING TELEVISION: 2

## 2023-03-22 ASSESSMENT — ANXIETY QUESTIONNAIRES
6. BECOMING EASILY ANNOYED OR IRRITABLE: 1
3. WORRYING TOO MUCH ABOUT DIFFERENT THINGS: MORE THAN HALF THE DAYS
5. BEING SO RESTLESS THAT IT IS HARD TO SIT STILL: 2
IF YOU CHECKED OFF ANY PROBLEMS ON THIS QUESTIONNAIRE, HOW DIFFICULT HAVE THESE PROBLEMS MADE IT FOR YOU TO DO YOUR WORK, TAKE CARE OF THINGS AT HOME, OR GET ALONG WITH OTHER PEOPLE: VERY DIFFICULT
2. NOT BEING ABLE TO STOP OR CONTROL WORRYING: NEARLY EVERY DAY
5. BEING SO RESTLESS THAT IT IS HARD TO SIT STILL: MORE THAN HALF THE DAYS
3. WORRYING TOO MUCH ABOUT DIFFERENT THINGS: 2
1. FEELING NERVOUS, ANXIOUS, OR ON EDGE: NEARLY EVERY DAY
1. FEELING NERVOUS, ANXIOUS, OR ON EDGE: 3
IF YOU CHECKED OFF ANY PROBLEMS ON THIS QUESTIONNAIRE, HOW DIFFICULT HAVE THESE PROBLEMS MADE IT FOR YOU TO DO YOUR WORK, TAKE CARE OF THINGS AT HOME, OR GET ALONG WITH OTHER PEOPLE: VERY DIFFICULT
4. TROUBLE RELAXING: 2
4. TROUBLE RELAXING: MORE THAN HALF THE DAYS
6. BECOMING EASILY ANNOYED OR IRRITABLE: SEVERAL DAYS
2. NOT BEING ABLE TO STOP OR CONTROL WORRYING: 3

## 2023-03-23 LAB
ALBUMIN SERPL-MCNC: 2.9 G/DL (ref 3.5–5)
ALBUMIN/GLOB SERPL: 1.1 (ref 0.4–1.6)
ALP SERPL-CCNC: 71 U/L (ref 50–136)
ALT SERPL-CCNC: 26 U/L (ref 12–65)
ANION GAP SERPL CALC-SCNC: 3 MMOL/L (ref 2–11)
AST SERPL-CCNC: 20 U/L (ref 15–37)
BILIRUB SERPL-MCNC: 0.2 MG/DL (ref 0.2–1.1)
BUN SERPL-MCNC: 6 MG/DL (ref 6–23)
CALCIUM SERPL-MCNC: 8.4 MG/DL (ref 8.3–10.4)
CHLORIDE SERPL-SCNC: 110 MMOL/L (ref 101–110)
CO2 SERPL-SCNC: 27 MMOL/L (ref 21–32)
CREAT SERPL-MCNC: 0.7 MG/DL (ref 0.6–1)
GLOBULIN SER CALC-MCNC: 2.7 G/DL (ref 2.8–4.5)
GLUCOSE SERPL-MCNC: 86 MG/DL (ref 65–100)
POTASSIUM SERPL-SCNC: 4.3 MMOL/L (ref 3.5–5.1)
PROT SERPL-MCNC: 5.6 G/DL (ref 6.3–8.2)
RHEUMATOID FACT SER QL LA: NEGATIVE
SODIUM SERPL-SCNC: 140 MMOL/L (ref 133–143)
URATE SERPL-MCNC: 3.9 MG/DL (ref 2.6–6)

## 2023-03-24 LAB
ANA SER QL: NEGATIVE
ENA JO1 AB SER-ACNC: <0.2 AI (ref 0–0.9)

## 2023-03-27 LAB — HLA-B*57:01 SPEC QL: NEGATIVE

## 2023-03-28 LAB — HLA-B27 QL NAA+PROBE: NEGATIVE

## 2023-03-29 ENCOUNTER — TELEMEDICINE (OUTPATIENT)
Dept: BEHAVIORAL/MENTAL HEALTH CLINIC | Age: 38
End: 2023-03-29

## 2023-03-29 DIAGNOSIS — F43.10 POST-TRAUMATIC STRESS DISORDER, UNSPECIFIED: ICD-10-CM

## 2023-03-29 DIAGNOSIS — F51.05 INSOMNIA DUE TO MENTAL DISORDER: ICD-10-CM

## 2023-03-29 DIAGNOSIS — Z62.820 RELATIONSHIP PROBLEM BETWEEN PARENT AND CHILD: ICD-10-CM

## 2023-03-29 DIAGNOSIS — F31.32 BIPOLAR AFFECTIVE DISORDER, CURRENTLY DEPRESSED, MODERATE (HCC): Primary | ICD-10-CM

## 2023-03-29 DIAGNOSIS — F60.3 BORDERLINE PERSONALITY DISORDER (HCC): ICD-10-CM

## 2023-03-29 DIAGNOSIS — F41.1 GENERALIZED ANXIETY DISORDER: ICD-10-CM

## 2023-03-29 RX ORDER — LAMOTRIGINE 150 MG/1
150 TABLET ORAL 2 TIMES DAILY
Qty: 60 TABLET | Refills: 3 | Status: SHIPPED | OUTPATIENT
Start: 2023-03-29

## 2023-03-29 RX ORDER — CLONAZEPAM 0.5 MG/1
0.5 TABLET ORAL 2 TIMES DAILY PRN
Qty: 60 TABLET | Refills: 3 | Status: SHIPPED | OUTPATIENT
Start: 2023-03-29 | End: 2023-07-27

## 2023-03-29 RX ORDER — VENLAFAXINE HYDROCHLORIDE 150 MG/1
150 CAPSULE, EXTENDED RELEASE ORAL DAILY
Qty: 30 CAPSULE | Refills: 3 | Status: SHIPPED | OUTPATIENT
Start: 2023-03-29

## 2023-03-29 RX ORDER — ZOLPIDEM TARTRATE 10 MG/1
10 TABLET ORAL NIGHTLY PRN
Qty: 30 TABLET | Refills: 3 | Status: SHIPPED | OUTPATIENT
Start: 2023-03-29 | End: 2023-07-27

## 2023-03-29 NOTE — PROGRESS NOTES
- 2.3 mg/dL Final     ALT   Date/Time Value Ref Range Status   03/22/2023 12:58 PM 26 12 - 65 U/L Final     AST   Date/Time Value Ref Range Status   03/22/2023 12:58 PM 20 15 - 37 U/L Final     TSH   Date/Time Value Ref Range Status   11/15/2021 11:28 AM 3.880 0.450 - 4.500 uIU/mL Final       Please refer to the lab tab in the epic and care everywhere for the most recent lab results. Plan:     [x]  Medication ordered: Effexor, Lamictal, Ambien, Klonopin to target depression, anxiety, insomnia, mood stabilization. [x]  Medication education/counseling provided  Medication dosage and time to take, purpose/expected benefits/risks, common side effects, lab monitoring required and reason, expected length of treatment, risk of no treatment, effects on pregnancy/nursing, financial availability discussed. Educated patient on  side effects/risks/benefits of meds including  cardiac arrhythmias, suicidal ideations, orthostatic hypotension, serotonin syndrome, risk of elizabeth/hypomania from antidepressants, withdrawals from abrupt discontinuation of meds, risk of rash, risk of infection, risk of bleeding, risk of seizures, addiction potential, memory impairment with long term use of benzos, respiratory depression, complex sleep behaviors on Ambien including sleepwalking, driving while asleep, making phone calls while asleep, preparing and eating food while asleep, high blood pressure, dizziness, drowsiness, sedation , risk of falls, Risk & benefits discussed: including but not limited to possible off-label medication usage. [x] Patient encouraged to contact the clinic if experiencing any adverse reactions with medications. [x] Follow [facility] for sxs improvement     I have reviewed the patients controlled substance prescription history, as maintained in the [place] prescription monitoring program, so that the prescription(s) for a  controlled substance can be given. Recommendations and Referrals:     Follow up

## 2023-03-30 RX ORDER — PANTOPRAZOLE SODIUM 40 MG/1
40 TABLET, DELAYED RELEASE ORAL DAILY
Qty: 30 TABLET | Refills: 3 | Status: SHIPPED | OUTPATIENT
Start: 2023-03-30

## 2023-03-31 RX ORDER — PANTOPRAZOLE SODIUM 40 MG/1
TABLET, DELAYED RELEASE ORAL
Qty: 30 TABLET | Refills: 7 | OUTPATIENT
Start: 2023-03-31

## 2023-04-04 ENCOUNTER — NURSE ONLY (OUTPATIENT)
Dept: FAMILY MEDICINE CLINIC | Facility: CLINIC | Age: 38
End: 2023-04-04

## 2023-04-04 DIAGNOSIS — E03.8 HYPOTHYROIDISM DUE TO HASHIMOTO'S THYROIDITIS: ICD-10-CM

## 2023-04-04 DIAGNOSIS — E78.2 MIXED HYPERLIPIDEMIA: Primary | ICD-10-CM

## 2023-04-04 DIAGNOSIS — E06.3 HYPOTHYROIDISM DUE TO HASHIMOTO'S THYROIDITIS: ICD-10-CM

## 2023-04-04 DIAGNOSIS — E78.2 MIXED HYPERLIPIDEMIA: ICD-10-CM

## 2023-04-05 ENCOUNTER — TELEPHONE (OUTPATIENT)
Dept: FAMILY MEDICINE CLINIC | Facility: CLINIC | Age: 38
End: 2023-04-05

## 2023-04-05 DIAGNOSIS — E06.3 HASHIMOTO'S THYROIDITIS: ICD-10-CM

## 2023-04-05 LAB
CHOLEST SERPL-MCNC: 181 MG/DL
HDLC SERPL-MCNC: 41 MG/DL (ref 40–60)
HDLC SERPL: 4.4
LDLC SERPL CALC-MCNC: 123.4 MG/DL
TRIGL SERPL-MCNC: 83 MG/DL (ref 35–150)
TSH, 3RD GENERATION: 5.4 UIU/ML (ref 0.36–3.74)
VLDLC SERPL CALC-MCNC: 16.6 MG/DL (ref 6–23)

## 2023-04-05 RX ORDER — LEVOTHYROXINE SODIUM 175 UG/1
TABLET ORAL
Qty: 30 TABLET | Refills: 2 | Status: SHIPPED | OUTPATIENT
Start: 2023-04-05

## 2023-04-05 NOTE — TELEPHONE ENCOUNTER
Called pt and discussed abnormal TSH and LDL. Verified she is taking her medication correctly and consistently. Denies any questions or concerns. Patient voices understanding of changing her levothyroxine dose and will need follow-up labs in 8 weeks. Avoid fried, fast, or processed foods and foods high in saturated fat/cholesterol. Consume increased dietary fiber daily. Consider starting statin.

## 2023-04-30 DIAGNOSIS — E55.9 VITAMIN D DEFICIENCY: ICD-10-CM

## 2023-05-01 RX ORDER — ERGOCALCIFEROL 1.25 MG/1
CAPSULE ORAL
Qty: 4 CAPSULE | Refills: 2 | Status: SHIPPED | OUTPATIENT
Start: 2023-05-01

## 2023-06-05 DIAGNOSIS — F41.1 GENERALIZED ANXIETY DISORDER: ICD-10-CM

## 2023-06-09 RX ORDER — CLONAZEPAM 0.5 MG/1
0.5 TABLET ORAL 2 TIMES DAILY PRN
Qty: 60 TABLET | OUTPATIENT
Start: 2023-06-09 | End: 2023-10-07

## 2023-06-18 DIAGNOSIS — F51.05 INSOMNIA DUE TO MENTAL DISORDER: ICD-10-CM

## 2023-06-22 ENCOUNTER — TELEPHONE (OUTPATIENT)
Dept: BEHAVIORAL/MENTAL HEALTH CLINIC | Age: 38
End: 2023-06-22

## 2023-06-22 NOTE — TELEPHONE ENCOUNTER
Patient is currently at emergency room with her daughter and rescheduled to 6/29.  Will need a refill for Ambien and Klonopin to last until them to be sent to [facility].

## 2023-06-23 DIAGNOSIS — F51.05 INSOMNIA DUE TO MENTAL DISORDER: ICD-10-CM

## 2023-06-23 DIAGNOSIS — F41.1 GENERALIZED ANXIETY DISORDER: ICD-10-CM

## 2023-06-23 RX ORDER — CLONAZEPAM 0.5 MG/1
0.5 TABLET ORAL 2 TIMES DAILY PRN
Qty: 60 TABLET | Refills: 0 | Status: SHIPPED | OUTPATIENT
Start: 2023-06-23 | End: 2023-10-21

## 2023-06-23 RX ORDER — ZOLPIDEM TARTRATE 10 MG/1
10 TABLET ORAL NIGHTLY PRN
Qty: 30 TABLET | Refills: 0 | Status: SHIPPED | OUTPATIENT
Start: 2023-06-23 | End: 2023-10-21

## 2023-06-28 ENCOUNTER — HOSPITAL ENCOUNTER (EMERGENCY)
Age: 38
Discharge: HOME | End: 2023-06-29
Attending: EMERGENCY MEDICINE
Payer: COMMERCIAL

## 2023-06-28 ENCOUNTER — APPOINTMENT (OUTPATIENT)
Dept: CT IMAGING | Age: 38
End: 2023-06-28
Payer: COMMERCIAL

## 2023-06-28 VITALS
RESPIRATION RATE: 16 BRPM | DIASTOLIC BLOOD PRESSURE: 90 MMHG | HEIGHT: 63 IN | HEART RATE: 87 BPM | TEMPERATURE: 98.8 F | SYSTOLIC BLOOD PRESSURE: 147 MMHG | WEIGHT: 238 LBS | OXYGEN SATURATION: 99 % | BODY MASS INDEX: 42.17 KG/M2

## 2023-06-28 DIAGNOSIS — S00.83XA CONTUSION OF FACE, INITIAL ENCOUNTER: ICD-10-CM
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
## 2023-06-28 DIAGNOSIS — H11.33 SUBCONJUNCTIVAL HEMORRHAGE OF BOTH EYES: ICD-10-CM

## 2023-06-28 PROCEDURE — 96372 THER/PROPH/DIAG INJ SC/IM: CPT

## 2023-06-28 PROCEDURE — 70450 CT HEAD/BRAIN W/O DYE: CPT

## 2023-06-28 PROCEDURE — 70486 CT MAXILLOFACIAL W/O DYE: CPT

## 2023-06-28 PROCEDURE — 99284 EMERGENCY DEPT VISIT MOD MDM: CPT

## 2023-06-28 ASSESSMENT — PAIN SCALES - GENERAL: PAINLEVEL_OUTOF10: 8

## 2023-06-28 ASSESSMENT — PAIN - FUNCTIONAL ASSESSMENT: PAIN_FUNCTIONAL_ASSESSMENT: 0-10

## 2023-06-28 ASSESSMENT — ENCOUNTER SYMPTOMS: COLOR CHANGE: 1

## 2023-06-29 ENCOUNTER — TELEMEDICINE (OUTPATIENT)
Dept: BEHAVIORAL/MENTAL HEALTH CLINIC | Age: 38
End: 2023-06-29
Payer: COMMERCIAL

## 2023-06-29 DIAGNOSIS — F43.10 POST-TRAUMATIC STRESS DISORDER, UNSPECIFIED: ICD-10-CM

## 2023-06-29 DIAGNOSIS — F60.3 BORDERLINE PERSONALITY DISORDER (HCC): ICD-10-CM

## 2023-06-29 DIAGNOSIS — F41.1 GENERALIZED ANXIETY DISORDER: ICD-10-CM

## 2023-06-29 DIAGNOSIS — F51.05 INSOMNIA DUE TO MENTAL DISORDER: ICD-10-CM

## 2023-06-29 DIAGNOSIS — R45.851 PASSIVE SUICIDAL IDEATIONS: ICD-10-CM

## 2023-06-29 DIAGNOSIS — Z65.8 PSYCHOSOCIAL STRESSORS: ICD-10-CM

## 2023-06-29 DIAGNOSIS — F31.32 BIPOLAR AFFECTIVE DISORDER, CURRENTLY DEPRESSED, MODERATE (HCC): Primary | ICD-10-CM

## 2023-06-29 PROCEDURE — 99214 OFFICE O/P EST MOD 30 MIN: CPT | Performed by: PSYCHIATRY & NEUROLOGY

## 2023-06-29 PROCEDURE — 6360000002 HC RX W HCPCS: Performed by: EMERGENCY MEDICINE

## 2023-06-29 RX ORDER — DOXEPIN HYDROCHLORIDE 10 MG/1
CAPSULE ORAL
COMMUNITY
Start: 2023-06-01 | End: 2023-06-29 | Stop reason: SDUPTHER

## 2023-06-29 RX ORDER — CLONAZEPAM 0.5 MG/1
0.5 TABLET ORAL 2 TIMES DAILY PRN
Qty: 60 TABLET | Refills: 2 | Status: SHIPPED | OUTPATIENT
Start: 2023-06-29 | End: 2023-10-27

## 2023-06-29 RX ORDER — DOXEPIN HYDROCHLORIDE 10 MG/1
CAPSULE ORAL
Qty: 30 CAPSULE | Refills: 3 | Status: SHIPPED | OUTPATIENT
Start: 2023-06-29

## 2023-06-29 RX ORDER — ZOLPIDEM TARTRATE 10 MG/1
10 TABLET ORAL NIGHTLY PRN
Qty: 30 TABLET | Refills: 2 | Status: SHIPPED | OUTPATIENT
Start: 2023-06-29 | End: 2023-10-27

## 2023-06-29 RX ORDER — NAPROXEN 500 MG/1
500 TABLET ORAL 2 TIMES DAILY WITH MEALS
Qty: 40 TABLET | Refills: 0 | Status: SHIPPED | OUTPATIENT
Start: 2023-06-29 | End: 2023-07-19

## 2023-06-29 RX ORDER — KETOROLAC TROMETHAMINE 30 MG/ML
30 INJECTION, SOLUTION INTRAMUSCULAR; INTRAVENOUS ONCE
Status: COMPLETED | OUTPATIENT
Start: 2023-06-29 | End: 2023-06-29

## 2023-06-29 RX ORDER — KETOROLAC TROMETHAMINE 30 MG/ML
30 INJECTION, SOLUTION INTRAMUSCULAR; INTRAVENOUS ONCE
Status: DISCONTINUED | OUTPATIENT
Start: 2023-06-29 | End: 2023-06-29

## 2023-06-29 RX ORDER — LAMOTRIGINE 150 MG/1
150 TABLET ORAL 2 TIMES DAILY
Qty: 60 TABLET | Refills: 3 | Status: SHIPPED | OUTPATIENT
Start: 2023-06-29

## 2023-06-29 RX ORDER — VENLAFAXINE HYDROCHLORIDE 150 MG/1
150 CAPSULE, EXTENDED RELEASE ORAL DAILY
Qty: 30 CAPSULE | Refills: 3 | Status: SHIPPED | OUTPATIENT
Start: 2023-06-29

## 2023-06-29 RX ADMIN — KETOROLAC TROMETHAMINE 30 MG: 30 INJECTION, SOLUTION INTRAMUSCULAR; INTRAVENOUS at 00:30

## 2023-06-29 ASSESSMENT — PATIENT HEALTH QUESTIONNAIRE - PHQ9
7. TROUBLE CONCENTRATING ON THINGS, SUCH AS READING THE NEWSPAPER OR WATCHING TELEVISION: 2
SUM OF ALL RESPONSES TO PHQ QUESTIONS 1-9: 15
1. LITTLE INTEREST OR PLEASURE IN DOING THINGS: 1
10. IF YOU CHECKED OFF ANY PROBLEMS, HOW DIFFICULT HAVE THESE PROBLEMS MADE IT FOR YOU TO DO YOUR WORK, TAKE CARE OF THINGS AT HOME, OR GET ALONG WITH OTHER PEOPLE: 2
5. POOR APPETITE OR OVEREATING: 2
SUM OF ALL RESPONSES TO PHQ9 QUESTIONS 1 & 2: 3
SUM OF ALL RESPONSES TO PHQ QUESTIONS 1-9: 14
3. TROUBLE FALLING OR STAYING ASLEEP: 2
SUM OF ALL RESPONSES TO PHQ QUESTIONS 1-9: 15
SUM OF ALL RESPONSES TO PHQ QUESTIONS 1-9: 15
9. THOUGHTS THAT YOU WOULD BE BETTER OFF DEAD, OR OF HURTING YOURSELF: 1
6. FEELING BAD ABOUT YOURSELF - OR THAT YOU ARE A FAILURE OR HAVE LET YOURSELF OR YOUR FAMILY DOWN: 2
2. FEELING DOWN, DEPRESSED OR HOPELESS: 2
4. FEELING TIRED OR HAVING LITTLE ENERGY: 2
8. MOVING OR SPEAKING SO SLOWLY THAT OTHER PEOPLE COULD HAVE NOTICED. OR THE OPPOSITE, BEING SO FIGETY OR RESTLESS THAT YOU HAVE BEEN MOVING AROUND A LOT MORE THAN USUAL: 1

## 2023-06-29 ASSESSMENT — ANXIETY QUESTIONNAIRES
2. NOT BEING ABLE TO STOP OR CONTROL WORRYING: 3
5. BEING SO RESTLESS THAT IT IS HARD TO SIT STILL: 2
1. FEELING NERVOUS, ANXIOUS, OR ON EDGE: 2
3. WORRYING TOO MUCH ABOUT DIFFERENT THINGS: 3
6. BECOMING EASILY ANNOYED OR IRRITABLE: 3
IF YOU CHECKED OFF ANY PROBLEMS ON THIS QUESTIONNAIRE, HOW DIFFICULT HAVE THESE PROBLEMS MADE IT FOR YOU TO DO YOUR WORK, TAKE CARE OF THINGS AT HOME, OR GET ALONG WITH OTHER PEOPLE: VERY DIFFICULT
GAD7 TOTAL SCORE: 16
4. TROUBLE RELAXING: 2
7. FEELING AFRAID AS IF SOMETHING AWFUL MIGHT HAPPEN: 1

## 2023-06-29 ASSESSMENT — PAIN SCALES - GENERAL: PAINLEVEL_OUTOF10: 7

## 2023-06-29 ASSESSMENT — COLUMBIA-SUICIDE SEVERITY RATING SCALE - C-SSRS
7. DID THIS OCCUR IN THE LAST THREE MONTHS: NO
6. HAVE YOU EVER DONE ANYTHING, STARTED TO DO ANYTHING, OR PREPARED TO DO ANYTHING TO END YOUR LIFE?: YES
2. HAVE YOU ACTUALLY HAD ANY THOUGHTS OF KILLING YOURSELF?: NO
1. WITHIN THE PAST MONTH, HAVE YOU WISHED YOU WERE DEAD OR WISHED YOU COULD GO TO SLEEP AND NOT WAKE UP?: YES

## 2023-06-30 RX ORDER — ZOLPIDEM TARTRATE 10 MG/1
10 TABLET ORAL NIGHTLY PRN
Qty: 30 TABLET | OUTPATIENT
Start: 2023-06-30 | End: 2023-10-28

## 2023-07-02 DIAGNOSIS — E06.3 HASHIMOTO'S THYROIDITIS: ICD-10-CM

## 2023-07-03 RX ORDER — LEVOTHYROXINE SODIUM 175 UG/1
TABLET ORAL
Qty: 30 TABLET | Refills: 0 | Status: SHIPPED | OUTPATIENT
Start: 2023-07-03 | End: 2023-07-19 | Stop reason: SDUPTHER

## 2023-07-16 SDOH — ECONOMIC STABILITY: HOUSING INSECURITY
IN THE LAST 12 MONTHS, WAS THERE A TIME WHEN YOU DID NOT HAVE A STEADY PLACE TO SLEEP OR SLEPT IN A SHELTER (INCLUDING NOW)?: NO

## 2023-07-16 SDOH — ECONOMIC STABILITY: INCOME INSECURITY: HOW HARD IS IT FOR YOU TO PAY FOR THE VERY BASICS LIKE FOOD, HOUSING, MEDICAL CARE, AND HEATING?: SOMEWHAT HARD

## 2023-07-16 SDOH — ECONOMIC STABILITY: FOOD INSECURITY: WITHIN THE PAST 12 MONTHS, THE FOOD YOU BOUGHT JUST DIDN'T LAST AND YOU DIDN'T HAVE MONEY TO GET MORE.: SOMETIMES TRUE

## 2023-07-16 SDOH — ECONOMIC STABILITY: FOOD INSECURITY: WITHIN THE PAST 12 MONTHS, YOU WORRIED THAT YOUR FOOD WOULD RUN OUT BEFORE YOU GOT MONEY TO BUY MORE.: SOMETIMES TRUE

## 2023-07-16 SDOH — ECONOMIC STABILITY: TRANSPORTATION INSECURITY
IN THE PAST 12 MONTHS, HAS LACK OF TRANSPORTATION KEPT YOU FROM MEETINGS, WORK, OR FROM GETTING THINGS NEEDED FOR DAILY LIVING?: PATIENT DECLINED

## 2023-07-16 ASSESSMENT — PATIENT HEALTH QUESTIONNAIRE - PHQ9
5. POOR APPETITE OR OVEREATING: 2
1. LITTLE INTEREST OR PLEASURE IN DOING THINGS: 1
6. FEELING BAD ABOUT YOURSELF - OR THAT YOU ARE A FAILURE OR HAVE LET YOURSELF OR YOUR FAMILY DOWN: SEVERAL DAYS
7. TROUBLE CONCENTRATING ON THINGS, SUCH AS READING THE NEWSPAPER OR WATCHING TELEVISION: SEVERAL DAYS
8. MOVING OR SPEAKING SO SLOWLY THAT OTHER PEOPLE COULD HAVE NOTICED. OR THE OPPOSITE, BEING SO FIGETY OR RESTLESS THAT YOU HAVE BEEN MOVING AROUND A LOT MORE THAN USUAL: 1
5. POOR APPETITE OR OVEREATING: MORE THAN HALF THE DAYS
7. TROUBLE CONCENTRATING ON THINGS, SUCH AS READING THE NEWSPAPER OR WATCHING TELEVISION: 1
SUM OF ALL RESPONSES TO PHQ9 QUESTIONS 1 & 2: 2
2. FEELING DOWN, DEPRESSED OR HOPELESS: SEVERAL DAYS
3. TROUBLE FALLING OR STAYING ASLEEP: 1
SUM OF ALL RESPONSES TO PHQ QUESTIONS 1-9: 9
SUM OF ALL RESPONSES TO PHQ QUESTIONS 1-9: 9
6. FEELING BAD ABOUT YOURSELF - OR THAT YOU ARE A FAILURE OR HAVE LET YOURSELF OR YOUR FAMILY DOWN: 1
4. FEELING TIRED OR HAVING LITTLE ENERGY: SEVERAL DAYS
SUM OF ALL RESPONSES TO PHQ QUESTIONS 1-9: 9
9. THOUGHTS THAT YOU WOULD BE BETTER OFF DEAD, OR OF HURTING YOURSELF: NOT AT ALL
9. THOUGHTS THAT YOU WOULD BE BETTER OFF DEAD, OR OF HURTING YOURSELF: 0
1. LITTLE INTEREST OR PLEASURE IN DOING THINGS: SEVERAL DAYS
8. MOVING OR SPEAKING SO SLOWLY THAT OTHER PEOPLE COULD HAVE NOTICED. OR THE OPPOSITE - BEING SO FIDGETY OR RESTLESS THAT YOU HAVE BEEN MOVING AROUND A LOT MORE THAN USUAL: SEVERAL DAYS
SUM OF ALL RESPONSES TO PHQ QUESTIONS 1-9: 9
2. FEELING DOWN, DEPRESSED OR HOPELESS: 1
3. TROUBLE FALLING OR STAYING ASLEEP: SEVERAL DAYS
10. IF YOU CHECKED OFF ANY PROBLEMS, HOW DIFFICULT HAVE THESE PROBLEMS MADE IT FOR YOU TO DO YOUR WORK, TAKE CARE OF THINGS AT HOME, OR GET ALONG WITH OTHER PEOPLE: 1
SUM OF ALL RESPONSES TO PHQ QUESTIONS 1-9: 9
4. FEELING TIRED OR HAVING LITTLE ENERGY: 1
10. IF YOU CHECKED OFF ANY PROBLEMS, HOW DIFFICULT HAVE THESE PROBLEMS MADE IT FOR YOU TO DO YOUR WORK, TAKE CARE OF THINGS AT HOME, OR GET ALONG WITH OTHER PEOPLE: SOMEWHAT DIFFICULT

## 2023-07-19 ENCOUNTER — OFFICE VISIT (OUTPATIENT)
Dept: FAMILY MEDICINE CLINIC | Facility: CLINIC | Age: 38
End: 2023-07-19
Payer: COMMERCIAL

## 2023-07-19 VITALS
HEART RATE: 98 BPM | OXYGEN SATURATION: 98 % | DIASTOLIC BLOOD PRESSURE: 64 MMHG | SYSTOLIC BLOOD PRESSURE: 110 MMHG | BODY MASS INDEX: 44.12 KG/M2 | HEIGHT: 63 IN | TEMPERATURE: 98.2 F | WEIGHT: 249 LBS

## 2023-07-19 DIAGNOSIS — E66.01 MORBID OBESITY (HCC): ICD-10-CM

## 2023-07-19 DIAGNOSIS — K21.9 GASTROESOPHAGEAL REFLUX DISEASE WITHOUT ESOPHAGITIS: ICD-10-CM

## 2023-07-19 DIAGNOSIS — E78.2 MIXED HYPERLIPIDEMIA: ICD-10-CM

## 2023-07-19 DIAGNOSIS — E55.9 VITAMIN D DEFICIENCY: ICD-10-CM

## 2023-07-19 DIAGNOSIS — R07.9 CHEST PAIN, UNSPECIFIED TYPE: ICD-10-CM

## 2023-07-19 DIAGNOSIS — E06.3 HASHIMOTO'S THYROIDITIS: Primary | ICD-10-CM

## 2023-07-19 LAB
25(OH)D3 SERPL-MCNC: 45.5 NG/ML (ref 30–100)
TSH, 3RD GENERATION: 0.33 UIU/ML (ref 0.36–3.74)

## 2023-07-19 PROCEDURE — 99214 OFFICE O/P EST MOD 30 MIN: CPT | Performed by: NURSE PRACTITIONER

## 2023-07-19 PROCEDURE — 93000 ELECTROCARDIOGRAM COMPLETE: CPT | Performed by: NURSE PRACTITIONER

## 2023-07-19 RX ORDER — LEVOTHYROXINE SODIUM 175 UG/1
175 TABLET ORAL
Qty: 30 TABLET | Refills: 5 | Status: SHIPPED | OUTPATIENT
Start: 2023-07-19 | End: 2023-07-21 | Stop reason: SDUPTHER

## 2023-07-19 RX ORDER — PANTOPRAZOLE SODIUM 40 MG/1
40 TABLET, DELAYED RELEASE ORAL DAILY PRN
Qty: 30 TABLET | Refills: 5 | Status: SHIPPED | OUTPATIENT
Start: 2023-07-19

## 2023-07-19 SDOH — ECONOMIC STABILITY: INCOME INSECURITY: HOW HARD IS IT FOR YOU TO PAY FOR THE VERY BASICS LIKE FOOD, HOUSING, MEDICAL CARE, AND HEATING?: NOT HARD AT ALL

## 2023-07-19 SDOH — ECONOMIC STABILITY: FOOD INSECURITY: WITHIN THE PAST 12 MONTHS, THE FOOD YOU BOUGHT JUST DIDN'T LAST AND YOU DIDN'T HAVE MONEY TO GET MORE.: SOMETIMES TRUE

## 2023-07-19 SDOH — ECONOMIC STABILITY: FOOD INSECURITY: WITHIN THE PAST 12 MONTHS, YOU WORRIED THAT YOUR FOOD WOULD RUN OUT BEFORE YOU GOT MONEY TO BUY MORE.: SOMETIMES TRUE

## 2023-07-19 ASSESSMENT — ENCOUNTER SYMPTOMS
DIARRHEA: 1
VOMITING: 0
WHEEZING: 0
SHORTNESS OF BREATH: 1
ABDOMINAL PAIN: 0
BLOOD IN STOOL: 0
NAUSEA: 0
CONSTIPATION: 1
COUGH: 0

## 2023-07-19 NOTE — PROGRESS NOTES
Jose Sanchez is a 30 y.o. female who presents today for the following:  Chief Complaint   Patient presents with    Follow-up     Pt presents for a F/U          Allergies   Allergen Reactions    Adhesive Tape Rash     Other reaction(s): Rash-Allergy    Escitalopram Other (See Comments)     hallucinations       Current Outpatient Medications   Medication Sig Dispense Refill    levothyroxine (SYNTHROID) 150 MCG tablet Take 1 tablet by mouth every morning (before breakfast) 90 tablet 0    pantoprazole (PROTONIX) 40 MG tablet Take 1 tablet by mouth daily as needed (heartburn) TAKE 1 TABLET BY MOUTH EVERY DAY 30 tablet 5    naproxen (NAPROSYN) 500 MG tablet Take 1 tablet by mouth 2 times daily (with meals) for 20 days 40 tablet 0    clonazePAM (KLONOPIN) 0.5 MG tablet Take 1 tablet by mouth 2 times daily as needed for Anxiety for up to 120 days. 60 tablet 2    doxepin (SINEQUAN) 10 MG capsule TAKE 1 CAPSULE BY MOUTH EVERY DAY AT NIGHT 30 capsule 3    zolpidem (AMBIEN) 10 MG tablet Take 1 tablet by mouth nightly as needed for Sleep for up to 120 days. 30 tablet 2    lamoTRIgine (LAMICTAL) 150 MG tablet Take 1 tablet by mouth 2 times daily 60 tablet 3    venlafaxine (EFFEXOR XR) 150 MG extended release capsule Take 1 capsule by mouth daily 30 capsule 3    vitamin D (ERGOCALCIFEROL) 1.25 MG (88070 UT) CAPS capsule TAKE 1 CAPSULE BY MOUTH ONE TIME PER WEEK 4 capsule 2    levonorgestrel (MIRENA) IUD 52 mg 1 Device by IntraUTERine route once      loratadine (CLARITIN) 10 MG tablet Take 1 tablet by mouth daily as needed      albuterol sulfate  (90 Base) MCG/ACT inhaler Inhale 2 puffs into the lungs every 4 hours as needed       No current facility-administered medications for this visit.        Past Medical History:   Diagnosis Date    ADHD (attention deficit hyperactivity disorder)     Anxiety     Bipolar 1 disorder (HCC)     Borderline personality disorder (HCC)     Chronic pain     Depression

## 2023-07-19 NOTE — PATIENT INSTRUCTIONS
Avoid spicy foods, caffeine beverages, citrus, chocolate, alcohol, fatty foods that may worsen your reflux.

## 2023-07-21 RX ORDER — LEVOTHYROXINE SODIUM 0.15 MG/1
150 TABLET ORAL
Qty: 90 TABLET | Refills: 0 | Status: SHIPPED | OUTPATIENT
Start: 2023-07-21

## 2023-07-25 DIAGNOSIS — E06.3 HYPOTHYROIDISM DUE TO HASHIMOTO'S THYROIDITIS: Primary | ICD-10-CM

## 2023-07-25 DIAGNOSIS — E03.8 HYPOTHYROIDISM DUE TO HASHIMOTO'S THYROIDITIS: Primary | ICD-10-CM

## 2023-07-31 DIAGNOSIS — E55.9 VITAMIN D DEFICIENCY: ICD-10-CM

## 2023-08-02 RX ORDER — ERGOCALCIFEROL 1.25 MG/1
CAPSULE ORAL
Qty: 4 CAPSULE | Refills: 2 | OUTPATIENT
Start: 2023-08-02

## 2023-09-04 DIAGNOSIS — E55.9 VITAMIN D DEFICIENCY: ICD-10-CM

## 2023-09-11 DIAGNOSIS — E55.9 VITAMIN D DEFICIENCY: ICD-10-CM

## 2023-09-12 DIAGNOSIS — E06.3 HYPOTHYROIDISM DUE TO HASHIMOTO'S THYROIDITIS: ICD-10-CM

## 2023-09-12 DIAGNOSIS — E03.8 HYPOTHYROIDISM DUE TO HASHIMOTO'S THYROIDITIS: ICD-10-CM

## 2023-09-12 LAB — TSH, 3RD GENERATION: 2.57 UIU/ML (ref 0.36–3.74)

## 2023-09-28 RX ORDER — ERGOCALCIFEROL 1.25 MG/1
50000 CAPSULE ORAL WEEKLY
Qty: 4 CAPSULE | Refills: 2 | OUTPATIENT
Start: 2023-09-28

## 2023-09-28 RX ORDER — ERGOCALCIFEROL 1.25 MG/1
CAPSULE ORAL
Qty: 4 CAPSULE | Refills: 2 | OUTPATIENT
Start: 2023-09-28

## 2023-10-01 DIAGNOSIS — E55.9 VITAMIN D DEFICIENCY: ICD-10-CM

## 2023-10-02 RX ORDER — ERGOCALCIFEROL 1.25 MG/1
CAPSULE ORAL
Qty: 4 CAPSULE | Refills: 2 | OUTPATIENT
Start: 2023-10-02

## 2023-10-04 ENCOUNTER — TELEMEDICINE (OUTPATIENT)
Dept: BEHAVIORAL/MENTAL HEALTH CLINIC | Age: 38
End: 2023-10-04
Payer: COMMERCIAL

## 2023-10-04 DIAGNOSIS — R45.851 PASSIVE SUICIDAL IDEATIONS: ICD-10-CM

## 2023-10-04 DIAGNOSIS — F43.10 POST-TRAUMATIC STRESS DISORDER, UNSPECIFIED: ICD-10-CM

## 2023-10-04 DIAGNOSIS — F60.3 BORDERLINE PERSONALITY DISORDER (HCC): ICD-10-CM

## 2023-10-04 DIAGNOSIS — F31.31 BIPOLAR AFFECTIVE DISORDER, CURRENTLY DEPRESSED, MILD (HCC): Primary | ICD-10-CM

## 2023-10-04 DIAGNOSIS — Z62.820 RELATIONSHIP PROBLEM BETWEEN PARENT AND CHILD: ICD-10-CM

## 2023-10-04 DIAGNOSIS — Z65.8 PSYCHOSOCIAL STRESSORS: ICD-10-CM

## 2023-10-04 DIAGNOSIS — F41.1 GENERALIZED ANXIETY DISORDER: ICD-10-CM

## 2023-10-04 DIAGNOSIS — F51.05 INSOMNIA DUE TO MENTAL DISORDER: ICD-10-CM

## 2023-10-04 PROCEDURE — 99214 OFFICE O/P EST MOD 30 MIN: CPT | Performed by: PSYCHIATRY & NEUROLOGY

## 2023-10-04 RX ORDER — LAMOTRIGINE 150 MG/1
150 TABLET ORAL 2 TIMES DAILY
Qty: 60 TABLET | Refills: 3 | Status: SHIPPED | OUTPATIENT
Start: 2023-10-04

## 2023-10-04 RX ORDER — BUPROPION HYDROCHLORIDE 150 MG/1
150 TABLET ORAL EVERY MORNING
Qty: 30 TABLET | Refills: 3 | Status: SHIPPED | OUTPATIENT
Start: 2023-10-04

## 2023-10-04 RX ORDER — ZOLPIDEM TARTRATE 10 MG/1
10 TABLET ORAL NIGHTLY PRN
Qty: 30 TABLET | Refills: 2 | Status: SHIPPED | OUTPATIENT
Start: 2023-10-04 | End: 2024-02-01

## 2023-10-04 RX ORDER — VENLAFAXINE HYDROCHLORIDE 150 MG/1
150 CAPSULE, EXTENDED RELEASE ORAL DAILY
Qty: 30 CAPSULE | Refills: 3 | Status: SHIPPED | OUTPATIENT
Start: 2023-10-04

## 2023-10-04 RX ORDER — CLONAZEPAM 0.5 MG/1
0.5 TABLET ORAL 2 TIMES DAILY PRN
Qty: 60 TABLET | Refills: 2 | Status: SHIPPED | OUTPATIENT
Start: 2023-10-04 | End: 2024-02-01

## 2023-10-04 RX ORDER — DOXEPIN HYDROCHLORIDE 10 MG/1
CAPSULE ORAL
Qty: 30 CAPSULE | Refills: 3 | Status: SHIPPED | OUTPATIENT
Start: 2023-10-04

## 2023-10-04 ASSESSMENT — ANXIETY QUESTIONNAIRES
7. FEELING AFRAID AS IF SOMETHING AWFUL MIGHT HAPPEN: SEVERAL DAYS
6. BECOMING EASILY ANNOYED OR IRRITABLE: 1
3. WORRYING TOO MUCH ABOUT DIFFERENT THINGS: MORE THAN HALF THE DAYS
5. BEING SO RESTLESS THAT IT IS HARD TO SIT STILL: SEVERAL DAYS
2. NOT BEING ABLE TO STOP OR CONTROL WORRYING: MORE THAN HALF THE DAYS
2. NOT BEING ABLE TO STOP OR CONTROL WORRYING: 3
IF YOU CHECKED OFF ANY PROBLEMS ON THIS QUESTIONNAIRE, HOW DIFFICULT HAVE THESE PROBLEMS MADE IT FOR YOU TO DO YOUR WORK, TAKE CARE OF THINGS AT HOME, OR GET ALONG WITH OTHER PEOPLE: SOMEWHAT DIFFICULT
4. TROUBLE RELAXING: 1
5. BEING SO RESTLESS THAT IT IS HARD TO SIT STILL: 1
7. FEELING AFRAID AS IF SOMETHING AWFUL MIGHT HAPPEN: 1
2. NOT BEING ABLE TO STOP OR CONTROL WORRYING: 2
IF YOU CHECKED OFF ANY PROBLEMS ON THIS QUESTIONNAIRE, HOW DIFFICULT HAVE THESE PROBLEMS MADE IT FOR YOU TO DO YOUR WORK, TAKE CARE OF THINGS AT HOME, OR GET ALONG WITH OTHER PEOPLE: VERY DIFFICULT
4. TROUBLE RELAXING: SEVERAL DAYS
4. TROUBLE RELAXING: 1
1. FEELING NERVOUS, ANXIOUS, OR ON EDGE: 2
1. FEELING NERVOUS, ANXIOUS, OR ON EDGE: 3
GAD7 TOTAL SCORE: 10
1. FEELING NERVOUS, ANXIOUS, OR ON EDGE: MORE THAN HALF THE DAYS
7. FEELING AFRAID AS IF SOMETHING AWFUL MIGHT HAPPEN: 1
6. BECOMING EASILY ANNOYED OR IRRITABLE: SEVERAL DAYS
IF YOU CHECKED OFF ANY PROBLEMS ON THIS QUESTIONNAIRE, HOW DIFFICULT HAVE THESE PROBLEMS MADE IT FOR YOU TO DO YOUR WORK, TAKE CARE OF THINGS AT HOME, OR GET ALONG WITH OTHER PEOPLE: SOMEWHAT DIFFICULT
GAD7 TOTAL SCORE: 13
6. BECOMING EASILY ANNOYED OR IRRITABLE: 1
5. BEING SO RESTLESS THAT IT IS HARD TO SIT STILL: 2
3. WORRYING TOO MUCH ABOUT DIFFERENT THINGS: 2
3. WORRYING TOO MUCH ABOUT DIFFERENT THINGS: 2

## 2023-10-04 ASSESSMENT — PATIENT HEALTH QUESTIONNAIRE - PHQ9
1. LITTLE INTEREST OR PLEASURE IN DOING THINGS: 0
SUM OF ALL RESPONSES TO PHQ QUESTIONS 1-9: 13
SUM OF ALL RESPONSES TO PHQ9 QUESTIONS 1 & 2: 1
7. TROUBLE CONCENTRATING ON THINGS, SUCH AS READING THE NEWSPAPER OR WATCHING TELEVISION: 1
SUM OF ALL RESPONSES TO PHQ QUESTIONS 1-9: 11
SUM OF ALL RESPONSES TO PHQ9 QUESTIONS 1 & 2: 3
4. FEELING TIRED OR HAVING LITTLE ENERGY: 1
6. FEELING BAD ABOUT YOURSELF - OR THAT YOU ARE A FAILURE OR HAVE LET YOURSELF OR YOUR FAMILY DOWN: 1
2. FEELING DOWN, DEPRESSED OR HOPELESS: 1
1. LITTLE INTEREST OR PLEASURE IN DOING THINGS: 1
SUM OF ALL RESPONSES TO PHQ QUESTIONS 1-9: 11
5. POOR APPETITE OR OVEREATING: MORE THAN HALF THE DAYS
3. TROUBLE FALLING OR STAYING ASLEEP: 2
3. TROUBLE FALLING OR STAYING ASLEEP: 2
3. TROUBLE FALLING OR STAYING ASLEEP: MORE THAN HALF THE DAYS
10. IF YOU CHECKED OFF ANY PROBLEMS, HOW DIFFICULT HAVE THESE PROBLEMS MADE IT FOR YOU TO DO YOUR WORK, TAKE CARE OF THINGS AT HOME, OR GET ALONG WITH OTHER PEOPLE: 2
8. MOVING OR SPEAKING SO SLOWLY THAT OTHER PEOPLE COULD HAVE NOTICED. OR THE OPPOSITE - BEING SO FIDGETY OR RESTLESS THAT YOU HAVE BEEN MOVING AROUND A LOT MORE THAN USUAL: MORE THAN HALF THE DAYS
6. FEELING BAD ABOUT YOURSELF - OR THAT YOU ARE A FAILURE OR HAVE LET YOURSELF OR YOUR FAMILY DOWN: 1
SUM OF ALL RESPONSES TO PHQ QUESTIONS 1-9: 13
10. IF YOU CHECKED OFF ANY PROBLEMS, HOW DIFFICULT HAVE THESE PROBLEMS MADE IT FOR YOU TO DO YOUR WORK, TAKE CARE OF THINGS AT HOME, OR GET ALONG WITH OTHER PEOPLE: 2
4. FEELING TIRED OR HAVING LITTLE ENERGY: 1
SUM OF ALL RESPONSES TO PHQ QUESTIONS 1-9: 13
2. FEELING DOWN, DEPRESSED OR HOPELESS: MORE THAN HALF THE DAYS
SUM OF ALL RESPONSES TO PHQ QUESTIONS 1-9: 12
2. FEELING DOWN, DEPRESSED OR HOPELESS: 2
SUM OF ALL RESPONSES TO PHQ QUESTIONS 1-9: 11
10. IF YOU CHECKED OFF ANY PROBLEMS, HOW DIFFICULT HAVE THESE PROBLEMS MADE IT FOR YOU TO DO YOUR WORK, TAKE CARE OF THINGS AT HOME, OR GET ALONG WITH OTHER PEOPLE: VERY DIFFICULT
5. POOR APPETITE OR OVEREATING: 2
9. THOUGHTS THAT YOU WOULD BE BETTER OFF DEAD, OR OF HURTING YOURSELF: SEVERAL DAYS
4. FEELING TIRED OR HAVING LITTLE ENERGY: SEVERAL DAYS
8. MOVING OR SPEAKING SO SLOWLY THAT OTHER PEOPLE COULD HAVE NOTICED. OR THE OPPOSITE, BEING SO FIGETY OR RESTLESS THAT YOU HAVE BEEN MOVING AROUND A LOT MORE THAN USUAL: 2
9. THOUGHTS THAT YOU WOULD BE BETTER OFF DEAD, OR OF HURTING YOURSELF: 1
9. THOUGHTS THAT YOU WOULD BE BETTER OFF DEAD, OR OF HURTING YOURSELF: 1
7. TROUBLE CONCENTRATING ON THINGS, SUCH AS READING THE NEWSPAPER OR WATCHING TELEVISION: SEVERAL DAYS
5. POOR APPETITE OR OVEREATING: 2
SUM OF ALL RESPONSES TO PHQ QUESTIONS 1-9: 10
6. FEELING BAD ABOUT YOURSELF - OR THAT YOU ARE A FAILURE OR HAVE LET YOURSELF OR YOUR FAMILY DOWN: SEVERAL DAYS
1. LITTLE INTEREST OR PLEASURE IN DOING THINGS: SEVERAL DAYS
8. MOVING OR SPEAKING SO SLOWLY THAT OTHER PEOPLE COULD HAVE NOTICED. OR THE OPPOSITE, BEING SO FIGETY OR RESTLESS THAT YOU HAVE BEEN MOVING AROUND A LOT MORE THAN USUAL: 2
SUM OF ALL RESPONSES TO PHQ QUESTIONS 1-9: 13
7. TROUBLE CONCENTRATING ON THINGS, SUCH AS READING THE NEWSPAPER OR WATCHING TELEVISION: 1

## 2023-10-04 ASSESSMENT — COLUMBIA-SUICIDE SEVERITY RATING SCALE - C-SSRS
3. HAVE YOU BEEN THINKING ABOUT HOW YOU MIGHT KILL YOURSELF?: YES
1. IN THE PAST MONTH, HAVE YOU WISHED YOU WERE DEAD OR WISHED YOU COULD GO TO SLEEP AND NOT WAKE UP?: YES
2. HAVE YOU ACTUALLY HAD ANY THOUGHTS OF KILLING YOURSELF?: YES
6. HAVE YOU EVER DONE ANYTHING, STARTED TO DO ANYTHING, OR PREPARED TO DO ANYTHING TO END YOUR LIFE?: NO
2. IN THE PAST MONTH, HAVE YOU ACTUALLY HAD ANY THOUGHTS OF KILLING YOURSELF?: YES
1. WITHIN THE PAST MONTH, HAVE YOU WISHED YOU WERE DEAD OR WISHED YOU COULD GO TO SLEEP AND NOT WAKE UP?: YES
7. DID THIS OCCUR IN THE LAST THREE MONTHS: NO
4. HAVE YOU HAD THESE THOUGHTS AND HAD SOME INTENTION OF ACTING ON THEM?: NO
6. IN YOUR LIFETIME, HAVE YOU EVER DONE ANYTHING, STARTED TO DO ANYTHING, OR PREPARED TO DO ANYTHING TO END YOUR LIFE?: YES
5. IN THE PAST MONTH, HAVE YOU STARTED TO WORK OUT OR WORKED OUT THE DETAILS OF HOW TO KILL YOURSELF? DO YOU INTEND TO CARRY OUT THIS PLAN?: NO
3. IN THE PAST MONTH, HAVE YOU BEEN THINKING ABOUT HOW YOU MIGHT KILL YOURSELF?: YES
5. HAVE YOU STARTED TO WORK OUT OR WORKED OUT THE DETAILS OF HOW TO KILL YOURSELF? DO YOU INTEND TO CARRY OUT THIS PLAN?: NO
4. IN THE PAST MONTH, HAVE YOU HAD THESE THOUGHTS AND HAD SOME INTENTION OF ACTING ON THEM?: NO

## 2023-10-04 NOTE — PROGRESS NOTES
Patient:  Lisa Duarte  Age:  41 y.o.  :  1985     SEX:  female MRN:  [id number]     RACE: White (non-)     SEEN:  [x]  PATIENT  []  SPOUSE []  OTHER:                  10/4/2023    11:25 AM 10/4/2023    10:44 AM 2023    10:15 PM   PHQ-9    Little interest or pleasure in doing things 1 0 1   Feeling down, depressed, or hopeless 2 1 1   Trouble falling or staying asleep, or sleeping too much 2 2 1   Feeling tired or having little energy 1 1 1   Poor appetite or overeating 2 2 2   Feeling bad about yourself - or that you are a failure or have let yourself or your family down 1 1 1   Trouble concentrating on things, such as reading the newspaper or watching television 1 1 1   Moving or speaking so slowly that other people could have noticed. Or the opposite - being so fidgety or restless that you have been moving around a lot more than usual 2 2 1   Thoughts that you would be better off dead, or of hurting yourself in some way 1 1 0   PHQ-2 Score 3 1 2   PHQ-9 Total Score 13 11 9   If you checked off any problems, how difficult have these problems made it for you to do your work, take care of things at home, or get along with other people?  2 2 1           10/4/2023    11:23 AM 10/4/2023    10:47 AM 2023     3:32 PM   NIKI-7 SCREENING   Feeling nervous, anxious, or on edge More than half the days Nearly every day More than half the days   Not being able to stop or control worrying More than half the days Nearly every day Nearly every day   Worrying too much about different things More than half the days More than half the days Nearly every day   Trouble relaxing Several days Several days More than half the days   Being so restless that it is hard to sit still Several days More than half the days More than half the days   Becoming easily annoyed or irritable Several days Several days Nearly every day   Feeling afraid as if something awful might happen Several days Several days

## 2023-10-06 NOTE — TELEPHONE ENCOUNTER
Gave pt notes. Pt verbalized understanding and thanked.
LMOM for pt to call back regarding refill.
DISPLAY PLAN FREE TEXT

## 2024-01-02 ENCOUNTER — TELEMEDICINE (OUTPATIENT)
Dept: BEHAVIORAL/MENTAL HEALTH CLINIC | Age: 39
End: 2024-01-02
Payer: COMMERCIAL

## 2024-01-02 DIAGNOSIS — F43.10 POST-TRAUMATIC STRESS DISORDER, UNSPECIFIED: ICD-10-CM

## 2024-01-02 DIAGNOSIS — Z65.8 PSYCHOSOCIAL STRESSORS: ICD-10-CM

## 2024-01-02 DIAGNOSIS — F51.05 INSOMNIA DUE TO MENTAL DISORDER: ICD-10-CM

## 2024-01-02 DIAGNOSIS — F31.31 BIPOLAR AFFECTIVE DISORDER, CURRENTLY DEPRESSED, MILD (HCC): Primary | ICD-10-CM

## 2024-01-02 DIAGNOSIS — F60.3 BORDERLINE PERSONALITY DISORDER (HCC): ICD-10-CM

## 2024-01-02 DIAGNOSIS — F41.1 GENERALIZED ANXIETY DISORDER: ICD-10-CM

## 2024-01-02 PROCEDURE — 99214 OFFICE O/P EST MOD 30 MIN: CPT | Performed by: PSYCHIATRY & NEUROLOGY

## 2024-01-02 RX ORDER — LAMOTRIGINE 150 MG/1
150 TABLET ORAL 2 TIMES DAILY
Qty: 60 TABLET | Refills: 3 | Status: SHIPPED | OUTPATIENT
Start: 2024-01-02

## 2024-01-02 RX ORDER — BUPROPION HYDROCHLORIDE 150 MG/1
150 TABLET ORAL EVERY MORNING
Qty: 30 TABLET | Refills: 3 | Status: SHIPPED | OUTPATIENT
Start: 2024-01-02

## 2024-01-02 RX ORDER — ZOLPIDEM TARTRATE 10 MG/1
10 TABLET ORAL NIGHTLY PRN
Qty: 30 TABLET | Refills: 3 | Status: SHIPPED | OUTPATIENT
Start: 2024-01-02 | End: 2024-05-01

## 2024-01-02 RX ORDER — VENLAFAXINE HYDROCHLORIDE 150 MG/1
150 CAPSULE, EXTENDED RELEASE ORAL DAILY
Qty: 30 CAPSULE | Refills: 3 | Status: SHIPPED | OUTPATIENT
Start: 2024-01-02

## 2024-01-02 RX ORDER — DOXEPIN HYDROCHLORIDE 10 MG/1
CAPSULE ORAL
Qty: 30 CAPSULE | Refills: 3 | Status: SHIPPED | OUTPATIENT
Start: 2024-01-02

## 2024-01-02 RX ORDER — CLONAZEPAM 0.5 MG/1
0.5 TABLET ORAL 2 TIMES DAILY PRN
Qty: 60 TABLET | Refills: 2 | Status: SHIPPED | OUTPATIENT
Start: 2024-01-02 | End: 2024-05-01

## 2024-01-02 ASSESSMENT — PATIENT HEALTH QUESTIONNAIRE - PHQ9
2. FEELING DOWN, DEPRESSED OR HOPELESS: 1
9. THOUGHTS THAT YOU WOULD BE BETTER OFF DEAD, OR OF HURTING YOURSELF: 0
4. FEELING TIRED OR HAVING LITTLE ENERGY: 1
5. POOR APPETITE OR OVEREATING: 1
8. MOVING OR SPEAKING SO SLOWLY THAT OTHER PEOPLE COULD HAVE NOTICED. OR THE OPPOSITE, BEING SO FIGETY OR RESTLESS THAT YOU HAVE BEEN MOVING AROUND A LOT MORE THAN USUAL: 1
10. IF YOU CHECKED OFF ANY PROBLEMS, HOW DIFFICULT HAVE THESE PROBLEMS MADE IT FOR YOU TO DO YOUR WORK, TAKE CARE OF THINGS AT HOME, OR GET ALONG WITH OTHER PEOPLE: 1
SUM OF ALL RESPONSES TO PHQ QUESTIONS 1-9: 8
SUM OF ALL RESPONSES TO PHQ QUESTIONS 1-9: 8
7. TROUBLE CONCENTRATING ON THINGS, SUCH AS READING THE NEWSPAPER OR WATCHING TELEVISION: 1
SUM OF ALL RESPONSES TO PHQ QUESTIONS 1-9: 8
SUM OF ALL RESPONSES TO PHQ QUESTIONS 1-9: 8
1. LITTLE INTEREST OR PLEASURE IN DOING THINGS: 1
SUM OF ALL RESPONSES TO PHQ9 QUESTIONS 1 & 2: 2
3. TROUBLE FALLING OR STAYING ASLEEP: 1
6. FEELING BAD ABOUT YOURSELF - OR THAT YOU ARE A FAILURE OR HAVE LET YOURSELF OR YOUR FAMILY DOWN: 1

## 2024-01-02 ASSESSMENT — ANXIETY QUESTIONNAIRES
4. TROUBLE RELAXING: 1
7. FEELING AFRAID AS IF SOMETHING AWFUL MIGHT HAPPEN: 1
3. WORRYING TOO MUCH ABOUT DIFFERENT THINGS: 2
GAD7 TOTAL SCORE: 8
2. NOT BEING ABLE TO STOP OR CONTROL WORRYING: 1
6. BECOMING EASILY ANNOYED OR IRRITABLE: 1
5. BEING SO RESTLESS THAT IT IS HARD TO SIT STILL: 1
1. FEELING NERVOUS, ANXIOUS, OR ON EDGE: 1
IF YOU CHECKED OFF ANY PROBLEMS ON THIS QUESTIONNAIRE, HOW DIFFICULT HAVE THESE PROBLEMS MADE IT FOR YOU TO DO YOUR WORK, TAKE CARE OF THINGS AT HOME, OR GET ALONG WITH OTHER PEOPLE: SOMEWHAT DIFFICULT

## 2024-01-02 NOTE — PROGRESS NOTES
Patient:  Edyta No  Age:  38 y.o.  :  1985     SEX:  female MRN:  [id number]     RACE: White (non-)     SEEN:  [x]  PATIENT  []  SPOUSE []  OTHER:                  2024     3:17 PM 10/4/2023    11:25 AM 10/4/2023    10:44 AM   PHQ-9    Little interest or pleasure in doing things 1 1 0   Feeling down, depressed, or hopeless 1 2 1   Trouble falling or staying asleep, or sleeping too much 1 2 2   Feeling tired or having little energy 1 1 1   Poor appetite or overeating 1 2 2   Feeling bad about yourself - or that you are a failure or have let yourself or your family down 1 1 1   Trouble concentrating on things, such as reading the newspaper or watching television 1 1 1   Moving or speaking so slowly that other people could have noticed. Or the opposite - being so fidgety or restless that you have been moving around a lot more than usual 1 2 2   Thoughts that you would be better off dead, or of hurting yourself in some way 0 1 1   PHQ-2 Score 2 3 1   PHQ-9 Total Score 8 13 11   If you checked off any problems, how difficult have these problems made it for you to do your work, take care of things at home, or get along with other people? 1 2 2           2024     3:19 PM 10/4/2023    11:23 AM 10/4/2023    10:47 AM   NIKI-7 SCREENING   Feeling nervous, anxious, or on edge Several days More than half the days Nearly every day   Not being able to stop or control worrying Several days More than half the days Nearly every day   Worrying too much about different things More than half the days More than half the days More than half the days   Trouble relaxing Several days Several days Several days   Being so restless that it is hard to sit still Several days Several days More than half the days   Becoming easily annoyed or irritable Several days Several days Several days   Feeling afraid as if something awful might happen Several days Several days Several days   NIKI-7 Total Score 8

## 2024-01-06 DIAGNOSIS — E06.3 HASHIMOTO'S THYROIDITIS: ICD-10-CM

## 2024-01-08 RX ORDER — LEVOTHYROXINE SODIUM 0.15 MG/1
150 TABLET ORAL
Qty: 30 TABLET | Refills: 5 | Status: SHIPPED | OUTPATIENT
Start: 2024-01-08

## 2024-03-12 ASSESSMENT — PATIENT HEALTH QUESTIONNAIRE - PHQ9
3. TROUBLE FALLING OR STAYING ASLEEP: MORE THAN HALF THE DAYS
8. MOVING OR SPEAKING SO SLOWLY THAT OTHER PEOPLE COULD HAVE NOTICED. OR THE OPPOSITE, BEING SO FIGETY OR RESTLESS THAT YOU HAVE BEEN MOVING AROUND A LOT MORE THAN USUAL: SEVERAL DAYS
SUM OF ALL RESPONSES TO PHQ QUESTIONS 1-9: 13
1. LITTLE INTEREST OR PLEASURE IN DOING THINGS: SEVERAL DAYS
SUM OF ALL RESPONSES TO PHQ QUESTIONS 1-9: 13
9. THOUGHTS THAT YOU WOULD BE BETTER OFF DEAD, OR OF HURTING YOURSELF: SEVERAL DAYS
SUM OF ALL RESPONSES TO PHQ QUESTIONS 1-9: 13
5. POOR APPETITE OR OVEREATING: MORE THAN HALF THE DAYS
4. FEELING TIRED OR HAVING LITTLE ENERGY: SEVERAL DAYS
10. IF YOU CHECKED OFF ANY PROBLEMS, HOW DIFFICULT HAVE THESE PROBLEMS MADE IT FOR YOU TO DO YOUR WORK, TAKE CARE OF THINGS AT HOME, OR GET ALONG WITH OTHER PEOPLE: VERY DIFFICULT
SUM OF ALL RESPONSES TO PHQ9 QUESTIONS 1 & 2: 3
6. FEELING BAD ABOUT YOURSELF - OR THAT YOU ARE A FAILURE OR HAVE LET YOURSELF OR YOUR FAMILY DOWN: MORE THAN HALF THE DAYS
SUM OF ALL RESPONSES TO PHQ QUESTIONS 1-9: 12
7. TROUBLE CONCENTRATING ON THINGS, SUCH AS READING THE NEWSPAPER OR WATCHING TELEVISION: SEVERAL DAYS
2. FEELING DOWN, DEPRESSED OR HOPELESS: MORE THAN HALF THE DAYS

## 2024-03-12 ASSESSMENT — ANXIETY QUESTIONNAIRES
IF YOU CHECKED OFF ANY PROBLEMS ON THIS QUESTIONNAIRE, HOW DIFFICULT HAVE THESE PROBLEMS MADE IT FOR YOU TO DO YOUR WORK, TAKE CARE OF THINGS AT HOME, OR GET ALONG WITH OTHER PEOPLE: SOMEWHAT DIFFICULT
5. BEING SO RESTLESS THAT IT IS HARD TO SIT STILL: SEVERAL DAYS
7. FEELING AFRAID AS IF SOMETHING AWFUL MIGHT HAPPEN: SEVERAL DAYS
3. WORRYING TOO MUCH ABOUT DIFFERENT THINGS: MORE THAN HALF THE DAYS
GAD7 TOTAL SCORE: 10
2. NOT BEING ABLE TO STOP OR CONTROL WORRYING: MORE THAN HALF THE DAYS
6. BECOMING EASILY ANNOYED OR IRRITABLE: SEVERAL DAYS
1. FEELING NERVOUS, ANXIOUS, OR ON EDGE: MORE THAN HALF THE DAYS
4. TROUBLE RELAXING: SEVERAL DAYS

## 2024-04-06 DIAGNOSIS — K21.9 GASTROESOPHAGEAL REFLUX DISEASE WITHOUT ESOPHAGITIS: ICD-10-CM

## 2024-04-06 RX ORDER — PANTOPRAZOLE SODIUM 40 MG/1
40 TABLET, DELAYED RELEASE ORAL DAILY PRN
Qty: 30 TABLET | Refills: 5 | OUTPATIENT
Start: 2024-04-06

## 2024-04-10 DIAGNOSIS — F41.1 GENERALIZED ANXIETY DISORDER: ICD-10-CM

## 2024-04-10 RX ORDER — CLONAZEPAM 0.5 MG/1
0.5 TABLET ORAL 2 TIMES DAILY PRN
Qty: 60 TABLET | Refills: 2 | OUTPATIENT
Start: 2024-04-10 | End: 2024-08-08

## 2024-04-29 DIAGNOSIS — F41.1 GENERALIZED ANXIETY DISORDER: ICD-10-CM

## 2024-04-29 DIAGNOSIS — K21.9 GASTROESOPHAGEAL REFLUX DISEASE WITHOUT ESOPHAGITIS: ICD-10-CM

## 2024-04-29 RX ORDER — CLONAZEPAM 0.5 MG/1
0.5 TABLET ORAL 2 TIMES DAILY PRN
Qty: 60 TABLET | Refills: 2 | OUTPATIENT
Start: 2024-04-29 | End: 2024-08-27

## 2024-04-29 RX ORDER — PANTOPRAZOLE SODIUM 40 MG/1
40 TABLET, DELAYED RELEASE ORAL DAILY PRN
Qty: 30 TABLET | Refills: 5 | Status: SHIPPED | OUTPATIENT
Start: 2024-04-29

## 2024-04-29 NOTE — TELEPHONE ENCOUNTER
Medication last filled on 7/19/23 with 6 mo supply. Pt does not have FU appt scheduled at this time.

## 2024-06-04 ASSESSMENT — PATIENT HEALTH QUESTIONNAIRE - PHQ9
SUM OF ALL RESPONSES TO PHQ9 QUESTIONS 1 & 2: 3
5. POOR APPETITE OR OVEREATING: MORE THAN HALF THE DAYS
1. LITTLE INTEREST OR PLEASURE IN DOING THINGS: SEVERAL DAYS
6. FEELING BAD ABOUT YOURSELF - OR THAT YOU ARE A FAILURE OR HAVE LET YOURSELF OR YOUR FAMILY DOWN: MORE THAN HALF THE DAYS
2. FEELING DOWN, DEPRESSED OR HOPELESS: MORE THAN HALF THE DAYS
SUM OF ALL RESPONSES TO PHQ QUESTIONS 1-9: 13
SUM OF ALL RESPONSES TO PHQ QUESTIONS 1-9: 13
6. FEELING BAD ABOUT YOURSELF - OR THAT YOU ARE A FAILURE OR HAVE LET YOURSELF OR YOUR FAMILY DOWN: MORE THAN HALF THE DAYS
2. FEELING DOWN, DEPRESSED OR HOPELESS: MORE THAN HALF THE DAYS
SUM OF ALL RESPONSES TO PHQ QUESTIONS 1-9: 13
10. IF YOU CHECKED OFF ANY PROBLEMS, HOW DIFFICULT HAVE THESE PROBLEMS MADE IT FOR YOU TO DO YOUR WORK, TAKE CARE OF THINGS AT HOME, OR GET ALONG WITH OTHER PEOPLE: VERY DIFFICULT
SUM OF ALL RESPONSES TO PHQ QUESTIONS 1-9: 13
SUM OF ALL RESPONSES TO PHQ QUESTIONS 1-9: 12
7. TROUBLE CONCENTRATING ON THINGS, SUCH AS READING THE NEWSPAPER OR WATCHING TELEVISION: SEVERAL DAYS
3. TROUBLE FALLING OR STAYING ASLEEP: MORE THAN HALF THE DAYS
7. TROUBLE CONCENTRATING ON THINGS, SUCH AS READING THE NEWSPAPER OR WATCHING TELEVISION: SEVERAL DAYS
10. IF YOU CHECKED OFF ANY PROBLEMS, HOW DIFFICULT HAVE THESE PROBLEMS MADE IT FOR YOU TO DO YOUR WORK, TAKE CARE OF THINGS AT HOME, OR GET ALONG WITH OTHER PEOPLE: VERY DIFFICULT
4. FEELING TIRED OR HAVING LITTLE ENERGY: SEVERAL DAYS
1. LITTLE INTEREST OR PLEASURE IN DOING THINGS: SEVERAL DAYS
5. POOR APPETITE OR OVEREATING: MORE THAN HALF THE DAYS
9. THOUGHTS THAT YOU WOULD BE BETTER OFF DEAD, OR OF HURTING YOURSELF: SEVERAL DAYS
9. THOUGHTS THAT YOU WOULD BE BETTER OFF DEAD, OR OF HURTING YOURSELF: SEVERAL DAYS
8. MOVING OR SPEAKING SO SLOWLY THAT OTHER PEOPLE COULD HAVE NOTICED. OR THE OPPOSITE - BEING SO FIDGETY OR RESTLESS THAT YOU HAVE BEEN MOVING AROUND A LOT MORE THAN USUAL: SEVERAL DAYS
8. MOVING OR SPEAKING SO SLOWLY THAT OTHER PEOPLE COULD HAVE NOTICED. OR THE OPPOSITE, BEING SO FIGETY OR RESTLESS THAT YOU HAVE BEEN MOVING AROUND A LOT MORE THAN USUAL: SEVERAL DAYS
3. TROUBLE FALLING OR STAYING ASLEEP: MORE THAN HALF THE DAYS
4. FEELING TIRED OR HAVING LITTLE ENERGY: SEVERAL DAYS

## 2024-06-04 ASSESSMENT — ANXIETY QUESTIONNAIRES
6. BECOMING EASILY ANNOYED OR IRRITABLE: MORE THAN HALF THE DAYS
2. NOT BEING ABLE TO STOP OR CONTROL WORRYING: SEVERAL DAYS
6. BECOMING EASILY ANNOYED OR IRRITABLE: MORE THAN HALF THE DAYS
GAD7 TOTAL SCORE: 12
4. TROUBLE RELAXING: MORE THAN HALF THE DAYS
5. BEING SO RESTLESS THAT IT IS HARD TO SIT STILL: MORE THAN HALF THE DAYS
IF YOU CHECKED OFF ANY PROBLEMS ON THIS QUESTIONNAIRE, HOW DIFFICULT HAVE THESE PROBLEMS MADE IT FOR YOU TO DO YOUR WORK, TAKE CARE OF THINGS AT HOME, OR GET ALONG WITH OTHER PEOPLE: VERY DIFFICULT
5. BEING SO RESTLESS THAT IT IS HARD TO SIT STILL: MORE THAN HALF THE DAYS
3. WORRYING TOO MUCH ABOUT DIFFERENT THINGS: MORE THAN HALF THE DAYS
1. FEELING NERVOUS, ANXIOUS, OR ON EDGE: MORE THAN HALF THE DAYS
1. FEELING NERVOUS, ANXIOUS, OR ON EDGE: MORE THAN HALF THE DAYS
3. WORRYING TOO MUCH ABOUT DIFFERENT THINGS: MORE THAN HALF THE DAYS
IF YOU CHECKED OFF ANY PROBLEMS ON THIS QUESTIONNAIRE, HOW DIFFICULT HAVE THESE PROBLEMS MADE IT FOR YOU TO DO YOUR WORK, TAKE CARE OF THINGS AT HOME, OR GET ALONG WITH OTHER PEOPLE: VERY DIFFICULT
7. FEELING AFRAID AS IF SOMETHING AWFUL MIGHT HAPPEN: SEVERAL DAYS
4. TROUBLE RELAXING: MORE THAN HALF THE DAYS
7. FEELING AFRAID AS IF SOMETHING AWFUL MIGHT HAPPEN: SEVERAL DAYS
2. NOT BEING ABLE TO STOP OR CONTROL WORRYING: SEVERAL DAYS

## 2024-06-04 ASSESSMENT — COLUMBIA-SUICIDE SEVERITY RATING SCALE - C-SSRS
1. IN THE PAST MONTH, HAVE YOU WISHED YOU WERE DEAD OR WISHED YOU COULD GO TO SLEEP AND NOT WAKE UP?: NO
2. IN THE PAST MONTH, HAVE YOU ACTUALLY HAD ANY THOUGHTS OF KILLING YOURSELF?: NO
5. HAVE YOU STARTED TO WORK OUT OR WORKED OUT THE DETAILS OF HOW TO KILL YOURSELF? DO YOU INTEND TO CARRY OUT THIS PLAN?: NO
7. DID THIS OCCUR IN THE LAST THREE MONTHS: NO
6. IN YOUR LIFETIME, HAVE YOU EVER DONE ANYTHING, STARTED TO DO ANYTHING, OR PREPARED TO DO ANYTHING TO END YOUR LIFE?: YES
3. HAVE YOU BEEN THINKING ABOUT HOW YOU MIGHT KILL YOURSELF?: NO
4. HAVE YOU HAD THESE THOUGHTS AND HAD SOME INTENTION OF ACTING ON THEM?: NO

## 2024-06-05 ENCOUNTER — TELEMEDICINE (OUTPATIENT)
Dept: BEHAVIORAL/MENTAL HEALTH CLINIC | Age: 39
End: 2024-06-05
Payer: COMMERCIAL

## 2024-06-05 DIAGNOSIS — F60.3 BORDERLINE PERSONALITY DISORDER (HCC): ICD-10-CM

## 2024-06-05 DIAGNOSIS — F51.05 INSOMNIA DUE TO MENTAL DISORDER: ICD-10-CM

## 2024-06-05 DIAGNOSIS — F41.1 GENERALIZED ANXIETY DISORDER: ICD-10-CM

## 2024-06-05 DIAGNOSIS — F31.31 BIPOLAR AFFECTIVE DISORDER, CURRENTLY DEPRESSED, MILD (HCC): Primary | ICD-10-CM

## 2024-06-05 DIAGNOSIS — F43.10 POST-TRAUMATIC STRESS DISORDER, UNSPECIFIED: ICD-10-CM

## 2024-06-05 DIAGNOSIS — Z65.8 PSYCHOSOCIAL STRESSORS: ICD-10-CM

## 2024-06-05 PROCEDURE — 99214 OFFICE O/P EST MOD 30 MIN: CPT | Performed by: PSYCHIATRY & NEUROLOGY

## 2024-06-05 RX ORDER — DOXEPIN HYDROCHLORIDE 10 MG/1
CAPSULE ORAL
Qty: 30 CAPSULE | Refills: 3 | Status: SHIPPED | OUTPATIENT
Start: 2024-06-05

## 2024-06-05 RX ORDER — VENLAFAXINE HYDROCHLORIDE 150 MG/1
150 CAPSULE, EXTENDED RELEASE ORAL DAILY
Qty: 30 CAPSULE | Refills: 3 | Status: SHIPPED | OUTPATIENT
Start: 2024-06-05

## 2024-06-05 RX ORDER — CLONAZEPAM 0.5 MG/1
0.5 TABLET ORAL 2 TIMES DAILY PRN
Qty: 60 TABLET | Refills: 2 | Status: SHIPPED | OUTPATIENT
Start: 2024-06-05 | End: 2024-10-03

## 2024-06-05 RX ORDER — LAMOTRIGINE 150 MG/1
150 TABLET ORAL 2 TIMES DAILY
Qty: 60 TABLET | Refills: 3 | Status: SHIPPED | OUTPATIENT
Start: 2024-06-05

## 2024-06-05 RX ORDER — BUPROPION HYDROCHLORIDE 150 MG/1
150 TABLET ORAL EVERY MORNING
Qty: 30 TABLET | Refills: 3 | Status: SHIPPED | OUTPATIENT
Start: 2024-06-05

## 2024-06-05 NOTE — PROGRESS NOTES
Patient:  Edyta No  Age:  38 y.o.  :  1985     SEX:  female MRN:  [id number]     RACE: White (non-)     SEEN:  [x]  PATIENT  []  SPOUSE []  OTHER:                  2024     6:41 PM 3/12/2024    11:19 AM 2024     3:17 PM   PHQ-9    Little interest or pleasure in doing things 1 1 1   Feeling down, depressed, or hopeless 2 2 1   Trouble falling or staying asleep, or sleeping too much 2 2 1   Feeling tired or having little energy 1 1 1   Poor appetite or overeating 2 2 1   Feeling bad about yourself - or that you are a failure or have let yourself or your family down 2 2 1   Trouble concentrating on things, such as reading the newspaper or watching television 1 1 1   Moving or speaking so slowly that other people could have noticed. Or the opposite - being so fidgety or restless that you have been moving around a lot more than usual 1 1 1   Thoughts that you would be better off dead, or of hurting yourself in some way 1 1 0   PHQ-2 Score 3 3    3 2   PHQ-9 Total Score 13 13    13 8   If you checked off any problems, how difficult have these problems made it for you to do your work, take care of things at home, or get along with other people? 2 2 1           2024     6:37 PM 3/12/2024    11:14 AM 2024     3:19 PM   NIKI-7 SCREENING   Feeling nervous, anxious, or on edge More than half the days More than half the days Several days   Not being able to stop or control worrying Several days More than half the days Several days   Worrying too much about different things More than half the days More than half the days More than half the days   Trouble relaxing More than half the days Several days Several days   Being so restless that it is hard to sit still More than half the days Several days Several days   Becoming easily annoyed or irritable More than half the days Several days Several days   Feeling afraid as if something awful might happen Several days Several days

## 2024-07-28 ENCOUNTER — PATIENT MESSAGE (OUTPATIENT)
Dept: BEHAVIORAL/MENTAL HEALTH CLINIC | Age: 39
End: 2024-07-28

## 2024-07-29 ENCOUNTER — TELEPHONE (OUTPATIENT)
Dept: BEHAVIORAL/MENTAL HEALTH CLINIC | Age: 39
End: 2024-07-29

## 2024-07-29 DIAGNOSIS — F51.05 INSOMNIA DUE TO MENTAL DISORDER: ICD-10-CM

## 2024-07-29 RX ORDER — ZOLPIDEM TARTRATE 10 MG/1
10 TABLET ORAL NIGHTLY PRN
Qty: 30 TABLET | Refills: 1 | Status: SHIPPED | OUTPATIENT
Start: 2024-07-29 | End: 2024-11-26

## 2024-07-29 NOTE — TELEPHONE ENCOUNTER
----- Message from Edyta No sent at 7/28/2024  3:35 PM EDT -----  Regarding: Prescription refill  Contact: [phone]  I have received updated refills on all my meds provided by Dr Ewing. But she did not give me a new prescription for my Ambien. This is to help with my severe insomnia. I have been having trouble going to sleep at night. I need it so the nightmares will stop. Thank you for your time and patience. I truly appreciate it, God bless.     Edyta No  [phone]

## 2024-08-08 DIAGNOSIS — E06.3 HASHIMOTO'S THYROIDITIS: ICD-10-CM

## 2024-08-08 RX ORDER — LEVOTHYROXINE SODIUM 0.15 MG/1
150 TABLET ORAL
Qty: 30 TABLET | Refills: 5 | Status: SHIPPED | OUTPATIENT
Start: 2024-08-08

## 2024-08-08 NOTE — TELEPHONE ENCOUNTER
Patient last seen in office 7/19/23. Levothyroxine was decreased from 175mcg to 150 mcg verified in that office note after labs. Repeat labs in September 2023 looked fine & patient was instructed to remain on that dose. RX last sent to pharmacy on 1/8/24 for a 30-day supply w/ 5 refills. Patient will be out before her next appt w/ Rhiannon on 8/23/24. RX pended & sent to provider for review.

## 2024-08-09 ENCOUNTER — OFFICE VISIT (OUTPATIENT)
Dept: FAMILY MEDICINE CLINIC | Facility: CLINIC | Age: 39
End: 2024-08-09
Payer: COMMERCIAL

## 2024-08-09 VITALS
DIASTOLIC BLOOD PRESSURE: 62 MMHG | HEIGHT: 63 IN | HEART RATE: 94 BPM | OXYGEN SATURATION: 99 % | BODY MASS INDEX: 44.65 KG/M2 | WEIGHT: 252 LBS | SYSTOLIC BLOOD PRESSURE: 100 MMHG

## 2024-08-09 DIAGNOSIS — Z11.59 NEED FOR HEPATITIS C SCREENING TEST: ICD-10-CM

## 2024-08-09 DIAGNOSIS — Z12.4 ENCOUNTER FOR PAPANICOLAOU SMEAR FOR CERVICAL CANCER SCREENING: Primary | ICD-10-CM

## 2024-08-09 DIAGNOSIS — T36.95XA ANTIBIOTIC-INDUCED YEAST INFECTION: ICD-10-CM

## 2024-08-09 DIAGNOSIS — Z00.00 ENCOUNTER FOR ANNUAL PHYSICAL EXAM: ICD-10-CM

## 2024-08-09 DIAGNOSIS — N89.8 VAGINAL DISCHARGE: ICD-10-CM

## 2024-08-09 DIAGNOSIS — B37.9 ANTIBIOTIC-INDUCED YEAST INFECTION: ICD-10-CM

## 2024-08-09 DIAGNOSIS — E06.3 HASHIMOTO'S THYROIDITIS: ICD-10-CM

## 2024-08-09 DIAGNOSIS — K76.0 FATTY LIVER: ICD-10-CM

## 2024-08-09 DIAGNOSIS — Z12.4 ENCOUNTER FOR PAPANICOLAOU SMEAR FOR CERVICAL CANCER SCREENING: ICD-10-CM

## 2024-08-09 DIAGNOSIS — E78.2 MIXED HYPERLIPIDEMIA: ICD-10-CM

## 2024-08-09 DIAGNOSIS — Z11.4 ENCOUNTER FOR SCREENING FOR HIV: ICD-10-CM

## 2024-08-09 PROCEDURE — 99395 PREV VISIT EST AGE 18-39: CPT | Performed by: NURSE PRACTITIONER

## 2024-08-09 RX ORDER — FLUCONAZOLE 150 MG/1
150 TABLET ORAL
Qty: 2 TABLET | Refills: 0 | Status: SHIPPED | OUTPATIENT
Start: 2024-08-09 | End: 2024-08-15

## 2024-08-09 SDOH — ECONOMIC STABILITY: FOOD INSECURITY: WITHIN THE PAST 12 MONTHS, THE FOOD YOU BOUGHT JUST DIDN'T LAST AND YOU DIDN'T HAVE MONEY TO GET MORE.: SOMETIMES TRUE

## 2024-08-09 SDOH — ECONOMIC STABILITY: HOUSING INSECURITY
IN THE LAST 12 MONTHS, WAS THERE A TIME WHEN YOU DID NOT HAVE A STEADY PLACE TO SLEEP OR SLEPT IN A SHELTER (INCLUDING NOW)?: PATIENT DECLINED

## 2024-08-09 SDOH — ECONOMIC STABILITY: FOOD INSECURITY: WITHIN THE PAST 12 MONTHS, YOU WORRIED THAT YOUR FOOD WOULD RUN OUT BEFORE YOU GOT MONEY TO BUY MORE.: OFTEN TRUE

## 2024-08-09 SDOH — ECONOMIC STABILITY: INCOME INSECURITY: HOW HARD IS IT FOR YOU TO PAY FOR THE VERY BASICS LIKE FOOD, HOUSING, MEDICAL CARE, AND HEATING?: NOT VERY HARD

## 2024-08-09 ASSESSMENT — ANXIETY QUESTIONNAIRES
GAD7 TOTAL SCORE: 9
6. BECOMING EASILY ANNOYED OR IRRITABLE: SEVERAL DAYS
7. FEELING AFRAID AS IF SOMETHING AWFUL MIGHT HAPPEN: SEVERAL DAYS
1. FEELING NERVOUS, ANXIOUS, OR ON EDGE: SEVERAL DAYS
5. BEING SO RESTLESS THAT IT IS HARD TO SIT STILL: SEVERAL DAYS
3. WORRYING TOO MUCH ABOUT DIFFERENT THINGS: MORE THAN HALF THE DAYS
4. TROUBLE RELAXING: SEVERAL DAYS
7. FEELING AFRAID AS IF SOMETHING AWFUL MIGHT HAPPEN: SEVERAL DAYS
3. WORRYING TOO MUCH ABOUT DIFFERENT THINGS: MORE THAN HALF THE DAYS
1. FEELING NERVOUS, ANXIOUS, OR ON EDGE: SEVERAL DAYS
IF YOU CHECKED OFF ANY PROBLEMS ON THIS QUESTIONNAIRE, HOW DIFFICULT HAVE THESE PROBLEMS MADE IT FOR YOU TO DO YOUR WORK, TAKE CARE OF THINGS AT HOME, OR GET ALONG WITH OTHER PEOPLE: SOMEWHAT DIFFICULT
IF YOU CHECKED OFF ANY PROBLEMS ON THIS QUESTIONNAIRE, HOW DIFFICULT HAVE THESE PROBLEMS MADE IT FOR YOU TO DO YOUR WORK, TAKE CARE OF THINGS AT HOME, OR GET ALONG WITH OTHER PEOPLE: SOMEWHAT DIFFICULT
2. NOT BEING ABLE TO STOP OR CONTROL WORRYING: MORE THAN HALF THE DAYS
2. NOT BEING ABLE TO STOP OR CONTROL WORRYING: MORE THAN HALF THE DAYS
6. BECOMING EASILY ANNOYED OR IRRITABLE: SEVERAL DAYS
5. BEING SO RESTLESS THAT IT IS HARD TO SIT STILL: SEVERAL DAYS
4. TROUBLE RELAXING: SEVERAL DAYS

## 2024-08-09 ASSESSMENT — PATIENT HEALTH QUESTIONNAIRE - PHQ9
8. MOVING OR SPEAKING SO SLOWLY THAT OTHER PEOPLE COULD HAVE NOTICED. OR THE OPPOSITE, BEING SO FIGETY OR RESTLESS THAT YOU HAVE BEEN MOVING AROUND A LOT MORE THAN USUAL: SEVERAL DAYS
3. TROUBLE FALLING OR STAYING ASLEEP: MORE THAN HALF THE DAYS
SUM OF ALL RESPONSES TO PHQ9 QUESTIONS 1 & 2: 2
SUM OF ALL RESPONSES TO PHQ QUESTIONS 1-9: 9
3. TROUBLE FALLING OR STAYING ASLEEP: MORE THAN HALF THE DAYS
8. MOVING OR SPEAKING SO SLOWLY THAT OTHER PEOPLE COULD HAVE NOTICED. OR THE OPPOSITE - BEING SO FIDGETY OR RESTLESS THAT YOU HAVE BEEN MOVING AROUND A LOT MORE THAN USUAL: SEVERAL DAYS
SUM OF ALL RESPONSES TO PHQ QUESTIONS 1-9: 2
9. THOUGHTS THAT YOU WOULD BE BETTER OFF DEAD, OR OF HURTING YOURSELF: NOT AT ALL
SUM OF ALL RESPONSES TO PHQ QUESTIONS 1-9: 9
2. FEELING DOWN, DEPRESSED OR HOPELESS: SEVERAL DAYS
1. LITTLE INTEREST OR PLEASURE IN DOING THINGS: SEVERAL DAYS
SUM OF ALL RESPONSES TO PHQ QUESTIONS 1-9: 2
SUM OF ALL RESPONSES TO PHQ QUESTIONS 1-9: 9
1. LITTLE INTEREST OR PLEASURE IN DOING THINGS: SEVERAL DAYS
10. IF YOU CHECKED OFF ANY PROBLEMS, HOW DIFFICULT HAVE THESE PROBLEMS MADE IT FOR YOU TO DO YOUR WORK, TAKE CARE OF THINGS AT HOME, OR GET ALONG WITH OTHER PEOPLE: SOMEWHAT DIFFICULT
6. FEELING BAD ABOUT YOURSELF - OR THAT YOU ARE A FAILURE OR HAVE LET YOURSELF OR YOUR FAMILY DOWN: SEVERAL DAYS
SUM OF ALL RESPONSES TO PHQ9 QUESTIONS 1 & 2: 2
4. FEELING TIRED OR HAVING LITTLE ENERGY: SEVERAL DAYS
6. FEELING BAD ABOUT YOURSELF - OR THAT YOU ARE A FAILURE OR HAVE LET YOURSELF OR YOUR FAMILY DOWN: SEVERAL DAYS
2. FEELING DOWN, DEPRESSED OR HOPELESS: SEVERAL DAYS
SUM OF ALL RESPONSES TO PHQ QUESTIONS 1-9: 9
1. LITTLE INTEREST OR PLEASURE IN DOING THINGS: SEVERAL DAYS
SUM OF ALL RESPONSES TO PHQ QUESTIONS 1-9: 2
SUM OF ALL RESPONSES TO PHQ QUESTIONS 1-9: 9
10. IF YOU CHECKED OFF ANY PROBLEMS, HOW DIFFICULT HAVE THESE PROBLEMS MADE IT FOR YOU TO DO YOUR WORK, TAKE CARE OF THINGS AT HOME, OR GET ALONG WITH OTHER PEOPLE: SOMEWHAT DIFFICULT
5. POOR APPETITE OR OVEREATING: SEVERAL DAYS
SUM OF ALL RESPONSES TO PHQ QUESTIONS 1-9: 2
7. TROUBLE CONCENTRATING ON THINGS, SUCH AS READING THE NEWSPAPER OR WATCHING TELEVISION: SEVERAL DAYS
2. FEELING DOWN, DEPRESSED OR HOPELESS: SEVERAL DAYS
7. TROUBLE CONCENTRATING ON THINGS, SUCH AS READING THE NEWSPAPER OR WATCHING TELEVISION: SEVERAL DAYS
4. FEELING TIRED OR HAVING LITTLE ENERGY: SEVERAL DAYS
5. POOR APPETITE OR OVEREATING: SEVERAL DAYS
9. THOUGHTS THAT YOU WOULD BE BETTER OFF DEAD, OR OF HURTING YOURSELF: NOT AT ALL

## 2024-08-09 ASSESSMENT — ENCOUNTER SYMPTOMS
NAUSEA: 0
VOMITING: 0
ABDOMINAL PAIN: 0
CONSTIPATION: 0

## 2024-08-09 NOTE — PROGRESS NOTES
1. Encounter for Papanicolaou smear for cervical cancer screening  -     PAP IG, Liquid-Based Rfx Aptima HPV when ASC-U, ASC-H, LSIL, HSIL, YUMIKO; Future  2. Vaginal discharge  -     NuSwab Vaginitis Plus (VG+) with Canddia (Six Species); Future  3. Antibiotic-induced yeast infection  -     fluconazole (DIFLUCAN) 150 MG tablet; Take 1 tablet by mouth every 72 hours for 6 days, Disp-2 tablet, R-0Normal  4. Encounter for annual physical exam  -     Comprehensive Metabolic Panel; Future  -     CBC with Auto Differential; Future  -     Lipid Panel; Future  5. Fatty liver  -     Comprehensive Metabolic Panel; Future  6. Mixed hyperlipidemia  -     Lipid Panel; Future  7. Hashimoto's thyroiditis  -     TSH; Future  8. Encounter for screening for HIV  -     HIV 1/2 Ag/Ab, 4TH Generation,W Rflx Confirm; Future  9. Need for hepatitis C screening test  -     Hepatitis C Antibody; Future     Greater than 50% counseling and/or coordination of care: the treatment regimen is extensive; detailed review.    Patient informed, we will call with blood work results within one week.  If you have not heard regarding results in over a week, please contact office.  You can also review results on [employer].       Follow-up and Dispositions    Return for change 08/23 office visit for lab instead.  .         PEÑA Rivas - CNP

## 2024-08-12 ENCOUNTER — TELEMEDICINE (OUTPATIENT)
Dept: BEHAVIORAL/MENTAL HEALTH CLINIC | Age: 39
End: 2024-08-12
Payer: COMMERCIAL

## 2024-08-12 DIAGNOSIS — N76.0 BACTERIAL VAGINOSIS: Primary | ICD-10-CM

## 2024-08-12 DIAGNOSIS — B96.89 BACTERIAL VAGINOSIS: Primary | ICD-10-CM

## 2024-08-12 DIAGNOSIS — F41.1 GENERALIZED ANXIETY DISORDER: ICD-10-CM

## 2024-08-12 DIAGNOSIS — Z65.8 PSYCHOSOCIAL STRESSORS: ICD-10-CM

## 2024-08-12 DIAGNOSIS — F51.05 INSOMNIA DUE TO MENTAL DISORDER: ICD-10-CM

## 2024-08-12 DIAGNOSIS — F60.3 BORDERLINE PERSONALITY DISORDER (HCC): ICD-10-CM

## 2024-08-12 DIAGNOSIS — F43.10 POST-TRAUMATIC STRESS DISORDER, UNSPECIFIED: ICD-10-CM

## 2024-08-12 DIAGNOSIS — F31.31 BIPOLAR AFFECTIVE DISORDER, CURRENTLY DEPRESSED, MILD (HCC): Primary | ICD-10-CM

## 2024-08-12 LAB
COLLECTION METHOD: NORMAL
CYTOLOGIST CVX/VAG CYTO: NORMAL
CYTOLOGY CVX/VAG DOC THIN PREP: NORMAL
DATE OF LMP: NORMAL
HPV REFLEX: NORMAL
Lab: NORMAL
PAP SOURCE: NORMAL
PATH REPORT.FINAL DX SPEC: NORMAL
STAT OF ADQ CVX/VAG CYTO-IMP: NORMAL

## 2024-08-12 PROCEDURE — 99214 OFFICE O/P EST MOD 30 MIN: CPT | Performed by: PSYCHIATRY & NEUROLOGY

## 2024-08-12 RX ORDER — METRONIDAZOLE 7.5 MG/G
1 GEL VAGINAL NIGHTLY
Qty: 70 G | Refills: 0 | Status: SHIPPED | OUTPATIENT
Start: 2024-08-12 | End: 2024-08-17

## 2024-08-12 RX ORDER — VENLAFAXINE HYDROCHLORIDE 150 MG/1
150 CAPSULE, EXTENDED RELEASE ORAL DAILY
Qty: 30 CAPSULE | Refills: 3 | Status: SHIPPED | OUTPATIENT
Start: 2024-08-12

## 2024-08-12 RX ORDER — BUPROPION HYDROCHLORIDE 150 MG/1
150 TABLET ORAL EVERY MORNING
Qty: 30 TABLET | Refills: 3 | Status: SHIPPED | OUTPATIENT
Start: 2024-08-12

## 2024-08-12 RX ORDER — CLONAZEPAM 0.5 MG/1
0.5 TABLET ORAL 2 TIMES DAILY PRN
Qty: 60 TABLET | Refills: 3 | Status: SHIPPED | OUTPATIENT
Start: 2024-08-12 | End: 2024-12-10

## 2024-08-12 RX ORDER — DOXEPIN HYDROCHLORIDE 10 MG/1
CAPSULE ORAL
Qty: 30 CAPSULE | Refills: 3 | Status: SHIPPED | OUTPATIENT
Start: 2024-08-12

## 2024-08-12 RX ORDER — LAMOTRIGINE 150 MG/1
150 TABLET ORAL 2 TIMES DAILY
Qty: 60 TABLET | Refills: 3 | Status: SHIPPED | OUTPATIENT
Start: 2024-08-12

## 2024-08-12 RX ORDER — ZOLPIDEM TARTRATE 10 MG/1
10 TABLET ORAL NIGHTLY PRN
Qty: 30 TABLET | Refills: 1 | Status: SHIPPED | OUTPATIENT
Start: 2024-08-12 | End: 2024-12-10

## 2024-08-12 NOTE — RESULT ENCOUNTER NOTE
Bacterial vaginosis on her vaginal swab.  Sent in metronidazole vaginally for 5 days at bedtime.  Although not an STD, condoms may help with prevention of reoccurrence; avoid scented hygiene products and douching which may affect the vaginal pH.    Please let me know if patient has any further questions or concerns.

## 2024-08-12 NOTE — PROGRESS NOTES
Patient:  Edyta No  Age:  38 y.o.  :  1985     SEX:  female MRN:  [id number]     RACE: White (non-)     SEEN:  [x]  PATIENT  []  SPOUSE []  OTHER:                  2024     4:10 PM 2024     2:18 PM 2024     6:41 PM   PHQ-9    Little interest or pleasure in doing things 1 1 1   Feeling down, depressed, or hopeless 1 1 2   Trouble falling or staying asleep, or sleeping too much  2 2   Feeling tired or having little energy  1 1   Poor appetite or overeating  1 2   Feeling bad about yourself - or that you are a failure or have let yourself or your family down  1 2   Trouble concentrating on things, such as reading the newspaper or watching television  1 1   Moving or speaking so slowly that other people could have noticed. Or the opposite - being so fidgety or restless that you have been moving around a lot more than usual  1 1   Thoughts that you would be better off dead, or of hurting yourself in some way  0 1   PHQ-2 Score 2 2 3   PHQ-9 Total Score 2 9 13   If you checked off any problems, how difficult have these problems made it for you to do your work, take care of things at home, or get along with other people?  1 2           2024     2:16 PM 2024     6:37 PM 3/12/2024    11:14 AM   NIKI-7 SCREENING   Feeling nervous, anxious, or on edge Several days More than half the days More than half the days   Not being able to stop or control worrying More than half the days Several days More than half the days   Worrying too much about different things More than half the days More than half the days More than half the days   Trouble relaxing Several days More than half the days Several days   Being so restless that it is hard to sit still Several days More than half the days Several days   Becoming easily annoyed or irritable Several days More than half the days Several days   Feeling afraid as if something awful might happen Several days Several days Several days

## 2024-08-12 NOTE — RESULT ENCOUNTER NOTE
Negative for STD on vaginal swab, still waiting on culture for candida and BV (please check on this with Sabine) and pap.  Please let me know if pt has any additional questions.  Keep scheduled appointment.

## 2024-08-13 NOTE — RESULT ENCOUNTER NOTE
Pap was normal.  Recommended follow up is 3-5 years.  Bacterial vaginosis on her vaginal swab.  Sent in metronidazole vaginally for 5 days at bedtime.  Although not an STD, condoms may help with prevention of reoccurrence; avoid scented hygiene products and douching which may affect the vaginal pH.    Please let me know if patient has any further questions or concerns

## 2024-08-14 LAB
A VAGINAE DNA VAG QL NAA+PROBE: ABNORMAL SCORE
BVAB2 DNA VAG QL NAA+PROBE: ABNORMAL SCORE
C ALBICANS DNA VAG QL NAA+PROBE: NEGATIVE
C GLABRATA DNA VAG QL NAA+PROBE: NEGATIVE
C TRACH RRNA SPEC QL NAA+PROBE: NEGATIVE
CANDIDA KRUSEI: NEGATIVE
CANDIDA LUSITANIAE, NAA: NEGATIVE
CANDIDA PARAPSILOSIS/TROPICALIS: NEGATIVE
MEGA1 DNA VAG QL NAA+PROBE: ABNORMAL SCORE
N GONORRHOEA RRNA SPEC QL NAA+PROBE: NEGATIVE
T VAGINALIS RRNA SPEC QL NAA+PROBE: NEGATIVE

## 2024-09-02 DIAGNOSIS — K21.9 GASTROESOPHAGEAL REFLUX DISEASE WITHOUT ESOPHAGITIS: ICD-10-CM

## 2024-09-03 RX ORDER — PANTOPRAZOLE SODIUM 40 MG/1
TABLET, DELAYED RELEASE ORAL
Qty: 90 TABLET | Refills: 2 | OUTPATIENT
Start: 2024-09-03

## 2024-09-04 RX ORDER — DOXEPIN HYDROCHLORIDE 10 MG/1
CAPSULE ORAL
Qty: 90 CAPSULE | Refills: 2 | OUTPATIENT
Start: 2024-09-04

## 2024-09-20 ENCOUNTER — NURSE ONLY (OUTPATIENT)
Dept: FAMILY MEDICINE CLINIC | Facility: CLINIC | Age: 39
End: 2024-09-20

## 2024-09-20 DIAGNOSIS — Z11.4 ENCOUNTER FOR SCREENING FOR HIV: ICD-10-CM

## 2024-09-20 DIAGNOSIS — Z00.00 ENCOUNTER FOR ANNUAL PHYSICAL EXAM: ICD-10-CM

## 2024-09-20 DIAGNOSIS — Z23 FLU VACCINE NEED: Primary | ICD-10-CM

## 2024-09-20 DIAGNOSIS — E06.3 HASHIMOTO'S THYROIDITIS: ICD-10-CM

## 2024-09-20 DIAGNOSIS — E78.2 MIXED HYPERLIPIDEMIA: ICD-10-CM

## 2024-09-20 DIAGNOSIS — Z11.59 NEED FOR HEPATITIS C SCREENING TEST: ICD-10-CM

## 2024-09-20 DIAGNOSIS — K76.0 FATTY LIVER: ICD-10-CM

## 2024-09-20 LAB
ALBUMIN SERPL-MCNC: 3.7 G/DL (ref 3.5–5)
ALBUMIN/GLOB SERPL: 1.3 (ref 1–1.9)
ALP SERPL-CCNC: 110 U/L (ref 35–104)
ALT SERPL-CCNC: 23 U/L (ref 12–65)
ANION GAP SERPL CALC-SCNC: 11 MMOL/L (ref 9–18)
APPEARANCE UR: CLEAR
AST SERPL-CCNC: 22 U/L (ref 15–37)
BACTERIA URNS QL MICRO: ABNORMAL /HPF
BASOPHILS # BLD: 0.1 K/UL (ref 0–0.2)
BASOPHILS NFR BLD: 1 % (ref 0–2)
BILIRUB SERPL-MCNC: <0.2 MG/DL (ref 0–1.2)
BILIRUB UR QL: NEGATIVE
BUN SERPL-MCNC: 8 MG/DL (ref 6–23)
CALCIUM SERPL-MCNC: 9.8 MG/DL (ref 8.8–10.2)
CASTS URNS QL MICRO: 0 /LPF
CHLORIDE SERPL-SCNC: 101 MMOL/L (ref 98–107)
CHOLEST SERPL-MCNC: 184 MG/DL (ref 0–200)
CO2 SERPL-SCNC: 27 MMOL/L (ref 20–28)
COLOR UR: ABNORMAL
CREAT SERPL-MCNC: 0.64 MG/DL (ref 0.6–1.1)
CRYSTALS URNS QL MICRO: 0 /LPF
DIFFERENTIAL METHOD BLD: NORMAL
EOSINOPHIL # BLD: 0.4 K/UL (ref 0–0.8)
EOSINOPHIL NFR BLD: 4 % (ref 0.5–7.8)
EPI CELLS #/AREA URNS HPF: ABNORMAL /HPF (ref 0–5)
ERYTHROCYTE [DISTWIDTH] IN BLOOD BY AUTOMATED COUNT: 13.9 % (ref 11.9–14.6)
GLOBULIN SER CALC-MCNC: 2.7 G/DL (ref 2.3–3.5)
GLUCOSE SERPL-MCNC: 89 MG/DL (ref 70–99)
GLUCOSE UR STRIP.AUTO-MCNC: NEGATIVE MG/DL
HCT VFR BLD AUTO: 42.2 % (ref 35.8–46.3)
HCV AB SER QL: NONREACTIVE
HDLC SERPL-MCNC: 34 MG/DL (ref 40–60)
HDLC SERPL: 5.5 (ref 0–5)
HGB BLD-MCNC: 13.5 G/DL (ref 11.7–15.4)
HGB UR QL STRIP: NEGATIVE
HIV 1+2 AB+HIV1 P24 AG SERPL QL IA: NONREACTIVE
HIV 1/2 RESULT COMMENT: NORMAL
HYALINE CASTS URNS QL MICRO: ABNORMAL /LPF
IMM GRANULOCYTES # BLD AUTO: 0 K/UL (ref 0–0.5)
IMM GRANULOCYTES NFR BLD AUTO: 0 % (ref 0–5)
KETONES UR QL STRIP.AUTO: NEGATIVE MG/DL
LDLC SERPL CALC-MCNC: 104 MG/DL (ref 0–100)
LEUKOCYTE ESTERASE UR QL STRIP.AUTO: NEGATIVE
LYMPHOCYTES # BLD: 1.8 K/UL (ref 0.5–4.6)
LYMPHOCYTES NFR BLD: 19 % (ref 13–44)
MCH RBC QN AUTO: 27.7 PG (ref 26.1–32.9)
MCHC RBC AUTO-ENTMCNC: 32 G/DL (ref 31.4–35)
MCV RBC AUTO: 86.7 FL (ref 82–102)
MONOCYTES # BLD: 0.8 K/UL (ref 0.1–1.3)
MONOCYTES NFR BLD: 9 % (ref 4–12)
MUCOUS THREADS URNS QL MICRO: 0 /LPF
NEUTS SEG # BLD: 6.2 K/UL (ref 1.7–8.2)
NEUTS SEG NFR BLD: 67 % (ref 43–78)
NITRITE UR QL STRIP.AUTO: NEGATIVE
NRBC # BLD: 0 K/UL (ref 0–0.2)
PH UR STRIP: 5.5 (ref 5–9)
PLATELET # BLD AUTO: 397 K/UL (ref 150–450)
PMV BLD AUTO: 9.6 FL (ref 9.4–12.3)
POTASSIUM SERPL-SCNC: 4.5 MMOL/L (ref 3.5–5.1)
PROT SERPL-MCNC: 6.4 G/DL (ref 6.3–8.2)
PROT UR STRIP-MCNC: NEGATIVE MG/DL
RBC # BLD AUTO: 4.87 M/UL (ref 4.05–5.2)
RBC #/AREA URNS HPF: ABNORMAL /HPF (ref 0–5)
SODIUM SERPL-SCNC: 140 MMOL/L (ref 136–145)
SP GR UR REFRACTOMETRY: 1.02 (ref 1–1.02)
TRIGL SERPL-MCNC: 234 MG/DL (ref 0–150)
TSH, 3RD GENERATION: 4.53 UIU/ML (ref 0.27–4.2)
URINE CULTURE IF INDICATED: ABNORMAL
UROBILINOGEN UR QL STRIP.AUTO: 0.2 EU/DL (ref 0.2–1)
VLDLC SERPL CALC-MCNC: 47 MG/DL (ref 6–23)
WBC # BLD AUTO: 9.3 K/UL (ref 4.3–11.1)
WBC URNS QL MICRO: ABNORMAL /HPF (ref 0–4)

## 2024-09-26 RX ORDER — LEVOTHYROXINE SODIUM 175 UG/1
175 TABLET ORAL
Qty: 30 TABLET | Refills: 2 | Status: SHIPPED | OUTPATIENT
Start: 2024-09-26

## 2024-11-13 ENCOUNTER — TELEMEDICINE (OUTPATIENT)
Dept: BEHAVIORAL/MENTAL HEALTH CLINIC | Age: 39
End: 2024-11-13
Payer: MEDICAID

## 2024-11-13 DIAGNOSIS — Z63.0 PROBLEMS IN RELATIONSHIP WITH SPOUSE OR PARTNER: ICD-10-CM

## 2024-11-13 DIAGNOSIS — F51.05 INSOMNIA DUE TO MENTAL DISORDER: ICD-10-CM

## 2024-11-13 DIAGNOSIS — Z65.8 PSYCHOSOCIAL STRESSORS: ICD-10-CM

## 2024-11-13 DIAGNOSIS — F31.31 BIPOLAR AFFECTIVE DISORDER, CURRENTLY DEPRESSED, MILD (HCC): Primary | ICD-10-CM

## 2024-11-13 DIAGNOSIS — F41.1 GENERALIZED ANXIETY DISORDER: ICD-10-CM

## 2024-11-13 DIAGNOSIS — F43.10 POST-TRAUMATIC STRESS DISORDER, UNSPECIFIED: ICD-10-CM

## 2024-11-13 DIAGNOSIS — F60.3 BORDERLINE PERSONALITY DISORDER (HCC): ICD-10-CM

## 2024-11-13 PROCEDURE — 99214 OFFICE O/P EST MOD 30 MIN: CPT | Performed by: PSYCHIATRY & NEUROLOGY

## 2024-11-13 RX ORDER — CLONAZEPAM 0.5 MG/1
0.5 TABLET ORAL 2 TIMES DAILY PRN
Qty: 60 TABLET | Refills: 3 | Status: SHIPPED | OUTPATIENT
Start: 2024-11-13 | End: 2025-03-13

## 2024-11-13 RX ORDER — LAMOTRIGINE 150 MG/1
150 TABLET ORAL 2 TIMES DAILY
Qty: 60 TABLET | Refills: 3 | Status: SHIPPED | OUTPATIENT
Start: 2024-11-13

## 2024-11-13 RX ORDER — DOXEPIN HYDROCHLORIDE 10 MG/1
CAPSULE ORAL
Qty: 30 CAPSULE | Refills: 3 | Status: SHIPPED | OUTPATIENT
Start: 2024-11-13

## 2024-11-13 RX ORDER — ZOLPIDEM TARTRATE 10 MG/1
10 TABLET ORAL NIGHTLY PRN
Qty: 30 TABLET | Refills: 1 | Status: SHIPPED | OUTPATIENT
Start: 2024-11-13 | End: 2025-03-13

## 2024-11-13 RX ORDER — VENLAFAXINE HYDROCHLORIDE 150 MG/1
150 CAPSULE, EXTENDED RELEASE ORAL DAILY
Qty: 30 CAPSULE | Refills: 3 | Status: SHIPPED | OUTPATIENT
Start: 2024-11-13

## 2024-11-13 RX ORDER — BUPROPION HYDROCHLORIDE 150 MG/1
150 TABLET ORAL EVERY MORNING
Qty: 30 TABLET | Refills: 3 | Status: SHIPPED | OUTPATIENT
Start: 2024-11-13

## 2024-11-13 SDOH — SOCIAL STABILITY - SOCIAL INSECURITY: PROBLEMS IN RELATIONSHIP WITH SPOUSE OR PARTNER: Z63.0

## 2024-11-13 ASSESSMENT — PATIENT HEALTH QUESTIONNAIRE - PHQ9
SUM OF ALL RESPONSES TO PHQ QUESTIONS 1-9: 10
4. FEELING TIRED OR HAVING LITTLE ENERGY: MORE THAN HALF THE DAYS
SUM OF ALL RESPONSES TO PHQ QUESTIONS 1-9: 10
8. MOVING OR SPEAKING SO SLOWLY THAT OTHER PEOPLE COULD HAVE NOTICED. OR THE OPPOSITE, BEING SO FIGETY OR RESTLESS THAT YOU HAVE BEEN MOVING AROUND A LOT MORE THAN USUAL: SEVERAL DAYS
5. POOR APPETITE OR OVEREATING: MORE THAN HALF THE DAYS
9. THOUGHTS THAT YOU WOULD BE BETTER OFF DEAD, OR OF HURTING YOURSELF: NOT AT ALL
6. FEELING BAD ABOUT YOURSELF - OR THAT YOU ARE A FAILURE OR HAVE LET YOURSELF OR YOUR FAMILY DOWN: SEVERAL DAYS
1. LITTLE INTEREST OR PLEASURE IN DOING THINGS: SEVERAL DAYS
2. FEELING DOWN, DEPRESSED OR HOPELESS: SEVERAL DAYS
3. TROUBLE FALLING OR STAYING ASLEEP: SEVERAL DAYS
SUM OF ALL RESPONSES TO PHQ QUESTIONS 1-9: 10
7. TROUBLE CONCENTRATING ON THINGS, SUCH AS READING THE NEWSPAPER OR WATCHING TELEVISION: SEVERAL DAYS
SUM OF ALL RESPONSES TO PHQ QUESTIONS 1-9: 10
10. IF YOU CHECKED OFF ANY PROBLEMS, HOW DIFFICULT HAVE THESE PROBLEMS MADE IT FOR YOU TO DO YOUR WORK, TAKE CARE OF THINGS AT HOME, OR GET ALONG WITH OTHER PEOPLE: SOMEWHAT DIFFICULT
SUM OF ALL RESPONSES TO PHQ9 QUESTIONS 1 & 2: 2

## 2024-11-13 ASSESSMENT — ANXIETY QUESTIONNAIRES
6. BECOMING EASILY ANNOYED OR IRRITABLE: SEVERAL DAYS
1. FEELING NERVOUS, ANXIOUS, OR ON EDGE: SEVERAL DAYS
7. FEELING AFRAID AS IF SOMETHING AWFUL MIGHT HAPPEN: SEVERAL DAYS
3. WORRYING TOO MUCH ABOUT DIFFERENT THINGS: SEVERAL DAYS
4. TROUBLE RELAXING: MORE THAN HALF THE DAYS
GAD7 TOTAL SCORE: 8
IF YOU CHECKED OFF ANY PROBLEMS ON THIS QUESTIONNAIRE, HOW DIFFICULT HAVE THESE PROBLEMS MADE IT FOR YOU TO DO YOUR WORK, TAKE CARE OF THINGS AT HOME, OR GET ALONG WITH OTHER PEOPLE: SOMEWHAT DIFFICULT
2. NOT BEING ABLE TO STOP OR CONTROL WORRYING: SEVERAL DAYS
5. BEING SO RESTLESS THAT IT IS HARD TO SIT STILL: SEVERAL DAYS

## 2024-11-13 NOTE — PROGRESS NOTES
Patient:  Edyta No  Age:  39 y.o.  :  1985     SEX:  female MRN:  [id number]     RACE: White (non-)     SEEN:  [x]  PATIENT  []  SPOUSE []  OTHER:                  2024     9:32 AM 2024     4:10 PM 2024     2:18 PM   PHQ-9    Little interest or pleasure in doing things 1 1 1   Feeling down, depressed, or hopeless 1 1 1   Trouble falling or staying asleep, or sleeping too much 1  2   Feeling tired or having little energy 2  1   Poor appetite or overeating 2  1   Feeling bad about yourself - or that you are a failure or have let yourself or your family down 1  1   Trouble concentrating on things, such as reading the newspaper or watching television 1  1   Moving or speaking so slowly that other people could have noticed. Or the opposite - being so fidgety or restless that you have been moving around a lot more than usual 1  1   Thoughts that you would be better off dead, or of hurting yourself in some way 0  0   PHQ-2 Score 2 2 2   PHQ-9 Total Score 10 2 9   If you checked off any problems, how difficult have these problems made it for you to do your work, take care of things at home, or get along with other people? 1  1           2024     9:35 AM 2024     2:16 PM 2024     6:37 PM   NIKI-7 SCREENING   Feeling nervous, anxious, or on edge Several days Several days More than half the days   Not being able to stop or control worrying Several days More than half the days Several days   Worrying too much about different things Several days More than half the days More than half the days   Trouble relaxing More than half the days Several days More than half the days   Being so restless that it is hard to sit still Several days Several days More than half the days   Becoming easily annoyed or irritable Several days Several days More than half the days   Feeling afraid as if something awful might happen Several days Several days Several days   NIKI-7 Total

## 2024-11-14 DIAGNOSIS — K21.9 GASTROESOPHAGEAL REFLUX DISEASE WITHOUT ESOPHAGITIS: ICD-10-CM

## 2024-11-18 RX ORDER — PANTOPRAZOLE SODIUM 40 MG/1
TABLET, DELAYED RELEASE ORAL
Qty: 30 TABLET | Refills: 5 | OUTPATIENT
Start: 2024-11-18

## 2025-01-29 DIAGNOSIS — E06.3 HASHIMOTO'S THYROIDITIS: ICD-10-CM

## 2025-01-29 RX ORDER — LEVOTHYROXINE SODIUM 175 UG/1
175 TABLET ORAL
Qty: 90 TABLET | Refills: 1 | OUTPATIENT
Start: 2025-01-29

## 2025-01-30 DIAGNOSIS — F51.05 INSOMNIA DUE TO MENTAL DISORDER: ICD-10-CM

## 2025-02-02 ASSESSMENT — PATIENT HEALTH QUESTIONNAIRE - PHQ9
6. FEELING BAD ABOUT YOURSELF - OR THAT YOU ARE A FAILURE OR HAVE LET YOURSELF OR YOUR FAMILY DOWN: MORE THAN HALF THE DAYS
4. FEELING TIRED OR HAVING LITTLE ENERGY: MORE THAN HALF THE DAYS
10. IF YOU CHECKED OFF ANY PROBLEMS, HOW DIFFICULT HAVE THESE PROBLEMS MADE IT FOR YOU TO DO YOUR WORK, TAKE CARE OF THINGS AT HOME, OR GET ALONG WITH OTHER PEOPLE: EXTREMELY DIFFICULT
8. MOVING OR SPEAKING SO SLOWLY THAT OTHER PEOPLE COULD HAVE NOTICED. OR THE OPPOSITE - BEING SO FIDGETY OR RESTLESS THAT YOU HAVE BEEN MOVING AROUND A LOT MORE THAN USUAL: MORE THAN HALF THE DAYS
3. TROUBLE FALLING OR STAYING ASLEEP: NEARLY EVERY DAY
SUM OF ALL RESPONSES TO PHQ QUESTIONS 1-9: 18
3. TROUBLE FALLING OR STAYING ASLEEP: NEARLY EVERY DAY
2. FEELING DOWN, DEPRESSED OR HOPELESS: NEARLY EVERY DAY
2. FEELING DOWN, DEPRESSED OR HOPELESS: NEARLY EVERY DAY
SUM OF ALL RESPONSES TO PHQ QUESTIONS 1-9: 18
1. LITTLE INTEREST OR PLEASURE IN DOING THINGS: MORE THAN HALF THE DAYS
1. LITTLE INTEREST OR PLEASURE IN DOING THINGS: MORE THAN HALF THE DAYS
SUM OF ALL RESPONSES TO PHQ QUESTIONS 1-9: 18
8. MOVING OR SPEAKING SO SLOWLY THAT OTHER PEOPLE COULD HAVE NOTICED. OR THE OPPOSITE, BEING SO FIGETY OR RESTLESS THAT YOU HAVE BEEN MOVING AROUND A LOT MORE THAN USUAL: MORE THAN HALF THE DAYS
4. FEELING TIRED OR HAVING LITTLE ENERGY: MORE THAN HALF THE DAYS
6. FEELING BAD ABOUT YOURSELF - OR THAT YOU ARE A FAILURE OR HAVE LET YOURSELF OR YOUR FAMILY DOWN: MORE THAN HALF THE DAYS
10. IF YOU CHECKED OFF ANY PROBLEMS, HOW DIFFICULT HAVE THESE PROBLEMS MADE IT FOR YOU TO DO YOUR WORK, TAKE CARE OF THINGS AT HOME, OR GET ALONG WITH OTHER PEOPLE: EXTREMELY DIFFICULT
5. POOR APPETITE OR OVEREATING: MORE THAN HALF THE DAYS
7. TROUBLE CONCENTRATING ON THINGS, SUCH AS READING THE NEWSPAPER OR WATCHING TELEVISION: MORE THAN HALF THE DAYS
SUM OF ALL RESPONSES TO PHQ9 QUESTIONS 1 & 2: 5
7. TROUBLE CONCENTRATING ON THINGS, SUCH AS READING THE NEWSPAPER OR WATCHING TELEVISION: MORE THAN HALF THE DAYS
9. THOUGHTS THAT YOU WOULD BE BETTER OFF DEAD, OR OF HURTING YOURSELF: NOT AT ALL
9. THOUGHTS THAT YOU WOULD BE BETTER OFF DEAD, OR OF HURTING YOURSELF: NOT AT ALL
SUM OF ALL RESPONSES TO PHQ QUESTIONS 1-9: 18
5. POOR APPETITE OR OVEREATING: MORE THAN HALF THE DAYS
SUM OF ALL RESPONSES TO PHQ QUESTIONS 1-9: 18

## 2025-02-02 ASSESSMENT — ANXIETY QUESTIONNAIRES
IF YOU CHECKED OFF ANY PROBLEMS ON THIS QUESTIONNAIRE, HOW DIFFICULT HAVE THESE PROBLEMS MADE IT FOR YOU TO DO YOUR WORK, TAKE CARE OF THINGS AT HOME, OR GET ALONG WITH OTHER PEOPLE: VERY DIFFICULT
6. BECOMING EASILY ANNOYED OR IRRITABLE: SEVERAL DAYS
3. WORRYING TOO MUCH ABOUT DIFFERENT THINGS: MORE THAN HALF THE DAYS
5. BEING SO RESTLESS THAT IT IS HARD TO SIT STILL: MORE THAN HALF THE DAYS
4. TROUBLE RELAXING: MORE THAN HALF THE DAYS
4. TROUBLE RELAXING: MORE THAN HALF THE DAYS
IF YOU CHECKED OFF ANY PROBLEMS ON THIS QUESTIONNAIRE, HOW DIFFICULT HAVE THESE PROBLEMS MADE IT FOR YOU TO DO YOUR WORK, TAKE CARE OF THINGS AT HOME, OR GET ALONG WITH OTHER PEOPLE: VERY DIFFICULT
7. FEELING AFRAID AS IF SOMETHING AWFUL MIGHT HAPPEN: MORE THAN HALF THE DAYS
1. FEELING NERVOUS, ANXIOUS, OR ON EDGE: NEARLY EVERY DAY
7. FEELING AFRAID AS IF SOMETHING AWFUL MIGHT HAPPEN: MORE THAN HALF THE DAYS
2. NOT BEING ABLE TO STOP OR CONTROL WORRYING: MORE THAN HALF THE DAYS
3. WORRYING TOO MUCH ABOUT DIFFERENT THINGS: MORE THAN HALF THE DAYS
5. BEING SO RESTLESS THAT IT IS HARD TO SIT STILL: MORE THAN HALF THE DAYS
1. FEELING NERVOUS, ANXIOUS, OR ON EDGE: NEARLY EVERY DAY
GAD7 TOTAL SCORE: 14
6. BECOMING EASILY ANNOYED OR IRRITABLE: SEVERAL DAYS
2. NOT BEING ABLE TO STOP OR CONTROL WORRYING: MORE THAN HALF THE DAYS

## 2025-02-03 RX ORDER — ZOLPIDEM TARTRATE 10 MG/1
10 TABLET ORAL NIGHTLY PRN
Qty: 30 TABLET | Refills: 1 | OUTPATIENT
Start: 2025-02-03 | End: 2025-06-03

## 2025-02-05 ENCOUNTER — TELEMEDICINE (OUTPATIENT)
Dept: BEHAVIORAL/MENTAL HEALTH CLINIC | Age: 40
End: 2025-02-05
Payer: MEDICAID

## 2025-02-05 DIAGNOSIS — F60.3 BORDERLINE PERSONALITY DISORDER (HCC): ICD-10-CM

## 2025-02-05 DIAGNOSIS — F41.1 GENERALIZED ANXIETY DISORDER: ICD-10-CM

## 2025-02-05 DIAGNOSIS — F31.32 BIPOLAR AFFECTIVE DISORDER, CURRENTLY DEPRESSED, MODERATE (HCC): Primary | ICD-10-CM

## 2025-02-05 DIAGNOSIS — Z65.8 PSYCHOSOCIAL STRESSORS: ICD-10-CM

## 2025-02-05 DIAGNOSIS — F43.10 POST-TRAUMATIC STRESS DISORDER, UNSPECIFIED: ICD-10-CM

## 2025-02-05 DIAGNOSIS — F51.05 INSOMNIA DUE TO MENTAL DISORDER: ICD-10-CM

## 2025-02-05 PROCEDURE — 99214 OFFICE O/P EST MOD 30 MIN: CPT | Performed by: PSYCHIATRY & NEUROLOGY

## 2025-02-05 RX ORDER — BUPROPION HYDROCHLORIDE 150 MG/1
150 TABLET ORAL EVERY MORNING
Qty: 30 TABLET | Refills: 3 | Status: SHIPPED | OUTPATIENT
Start: 2025-03-13

## 2025-02-05 RX ORDER — CLONAZEPAM 0.5 MG/1
0.5 TABLET ORAL 2 TIMES DAILY PRN
Qty: 60 TABLET | Refills: 3 | Status: SHIPPED | OUTPATIENT
Start: 2025-03-13 | End: 2025-07-11

## 2025-02-05 RX ORDER — DOXEPIN HYDROCHLORIDE 10 MG/1
CAPSULE ORAL
Qty: 30 CAPSULE | Refills: 3 | Status: SHIPPED | OUTPATIENT
Start: 2025-03-13

## 2025-02-05 RX ORDER — LAMOTRIGINE 150 MG/1
150 TABLET ORAL 2 TIMES DAILY
Qty: 60 TABLET | Refills: 3 | Status: SHIPPED | OUTPATIENT
Start: 2025-03-13

## 2025-02-05 RX ORDER — ZOLPIDEM TARTRATE 10 MG/1
10 TABLET ORAL NIGHTLY PRN
Qty: 30 TABLET | Refills: 1 | Status: SHIPPED | OUTPATIENT
Start: 2025-03-13 | End: 2025-07-11

## 2025-02-05 RX ORDER — VENLAFAXINE HYDROCHLORIDE 150 MG/1
150 CAPSULE, EXTENDED RELEASE ORAL DAILY
Qty: 30 CAPSULE | Refills: 3 | Status: SHIPPED | OUTPATIENT
Start: 2025-03-13

## 2025-02-05 NOTE — PROGRESS NOTES
Patient:  Edyta No  Age:  39 y.o.  :  1985     SEX:  female MRN:  [id number]     RACE: White (non-)     SEEN:  [x]  PATIENT  []  SPOUSE []  OTHER:                  2025     3:53 PM 2024     9:32 AM 2024     4:10 PM   PHQ-9    Little interest or pleasure in doing things 2 1 1   Feeling down, depressed, or hopeless 3 1 1   Trouble falling or staying asleep, or sleeping too much 3 1    Feeling tired or having little energy 2 2    Poor appetite or overeating 2 2    Feeling bad about yourself - or that you are a failure or have let yourself or your family down 2 1    Trouble concentrating on things, such as reading the newspaper or watching television 2 1    Moving or speaking so slowly that other people could have noticed. Or the opposite - being so fidgety or restless that you have been moving around a lot more than usual 2 1    Thoughts that you would be better off dead, or of hurting yourself in some way 0 0    PHQ-2 Score 5 2 2   PHQ-9 Total Score 18 10 2   If you checked off any problems, how difficult have these problems made it for you to do your work, take care of things at home, or get along with other people? 3 1            2025     3:51 PM 2024     9:35 AM 2024     2:16 PM   NIKI-7 SCREENING   Feeling nervous, anxious, or on edge Nearly every day Several days Several days   Not being able to stop or control worrying More than half the days Several days More than half the days   Worrying too much about different things More than half the days Several days More than half the days   Trouble relaxing More than half the days More than half the days Several days   Being so restless that it is hard to sit still More than half the days Several days Several days   Becoming easily annoyed or irritable Several days Several days Several days   Feeling afraid as if something awful might happen More than half the days Several days Several days   NIKI-7 Total

## 2025-02-13 DIAGNOSIS — E06.3 HASHIMOTO'S THYROIDITIS: ICD-10-CM

## 2025-02-14 RX ORDER — LEVOTHYROXINE SODIUM 175 UG/1
175 TABLET ORAL
Qty: 30 TABLET | Refills: 2 | OUTPATIENT
Start: 2025-02-14

## 2025-04-01 DIAGNOSIS — E55.9 VITAMIN D DEFICIENCY: ICD-10-CM

## 2025-04-01 DIAGNOSIS — E06.3 HASHIMOTO'S THYROIDITIS: ICD-10-CM

## 2025-04-01 DIAGNOSIS — K21.9 GASTROESOPHAGEAL REFLUX DISEASE WITHOUT ESOPHAGITIS: ICD-10-CM

## 2025-04-03 RX ORDER — LEVOTHYROXINE SODIUM 175 UG/1
175 TABLET ORAL
Qty: 30 TABLET | Refills: 2 | Status: SHIPPED | OUTPATIENT
Start: 2025-04-03

## 2025-04-03 RX ORDER — PANTOPRAZOLE SODIUM 40 MG/1
40 TABLET, DELAYED RELEASE ORAL DAILY PRN
Qty: 30 TABLET | Refills: 5 | Status: SHIPPED | OUTPATIENT
Start: 2025-04-03

## 2025-04-03 RX ORDER — ERGOCALCIFEROL 1.25 MG/1
50000 CAPSULE, LIQUID FILLED ORAL WEEKLY
Qty: 4 CAPSULE | Refills: 2 | OUTPATIENT
Start: 2025-04-03

## 2025-05-16 ENCOUNTER — OFFICE VISIT (OUTPATIENT)
Dept: FAMILY MEDICINE CLINIC | Facility: CLINIC | Age: 40
End: 2025-05-16

## 2025-05-16 VITALS
SYSTOLIC BLOOD PRESSURE: 124 MMHG | DIASTOLIC BLOOD PRESSURE: 70 MMHG | HEART RATE: 81 BPM | BODY MASS INDEX: 45.17 KG/M2 | OXYGEN SATURATION: 98 % | WEIGHT: 255 LBS | TEMPERATURE: 98.2 F

## 2025-05-16 DIAGNOSIS — E66.01 MORBID OBESITY (HCC): ICD-10-CM

## 2025-05-16 DIAGNOSIS — E78.2 MIXED HYPERLIPIDEMIA: ICD-10-CM

## 2025-05-16 DIAGNOSIS — K21.9 GASTROESOPHAGEAL REFLUX DISEASE WITHOUT ESOPHAGITIS: Primary | ICD-10-CM

## 2025-05-16 DIAGNOSIS — G43.709 CHRONIC MIGRAINE WITHOUT AURA WITHOUT STATUS MIGRAINOSUS, NOT INTRACTABLE: ICD-10-CM

## 2025-05-16 DIAGNOSIS — K76.0 FATTY LIVER: ICD-10-CM

## 2025-05-16 DIAGNOSIS — E06.3 HASHIMOTO'S THYROIDITIS: ICD-10-CM

## 2025-05-16 DIAGNOSIS — R07.9 CHEST PAIN, UNSPECIFIED TYPE: ICD-10-CM

## 2025-05-16 LAB
ALBUMIN SERPL-MCNC: 3.6 G/DL (ref 3.5–5)
ALBUMIN/GLOB SERPL: 1.1 (ref 1–1.9)
ALP SERPL-CCNC: 89 U/L (ref 35–104)
ALT SERPL-CCNC: 15 U/L (ref 8–45)
ANION GAP SERPL CALC-SCNC: 8 MMOL/L (ref 7–16)
AST SERPL-CCNC: 21 U/L (ref 15–37)
BASOPHILS # BLD: 0.13 K/UL (ref 0–0.2)
BASOPHILS NFR BLD: 1.4 % (ref 0–2)
BILIRUB SERPL-MCNC: <0.2 MG/DL (ref 0–1.2)
BUN SERPL-MCNC: 10 MG/DL (ref 6–23)
CALCIUM SERPL-MCNC: 9.7 MG/DL (ref 8.8–10.2)
CHLORIDE SERPL-SCNC: 103 MMOL/L (ref 98–107)
CHOLEST SERPL-MCNC: 181 MG/DL (ref 0–200)
CO2 SERPL-SCNC: 28 MMOL/L (ref 20–29)
CREAT SERPL-MCNC: 0.76 MG/DL (ref 0.6–1.1)
DIFFERENTIAL METHOD BLD: NORMAL
EOSINOPHIL # BLD: 0.28 K/UL (ref 0–0.8)
EOSINOPHIL NFR BLD: 3 % (ref 0.5–7.8)
ERYTHROCYTE [DISTWIDTH] IN BLOOD BY AUTOMATED COUNT: 14.3 % (ref 11.9–14.6)
GLOBULIN SER CALC-MCNC: 3.4 G/DL (ref 2.3–3.5)
GLUCOSE SERPL-MCNC: 86 MG/DL (ref 70–99)
HCT VFR BLD AUTO: 40.6 % (ref 35.8–46.3)
HDLC SERPL-MCNC: 37 MG/DL (ref 40–60)
HDLC SERPL: 4.9 (ref 0–5)
HGB BLD-MCNC: 13.2 G/DL (ref 11.7–15.4)
IMM GRANULOCYTES # BLD AUTO: 0.04 K/UL (ref 0–0.5)
IMM GRANULOCYTES NFR BLD AUTO: 0.4 % (ref 0–5)
LDLC SERPL CALC-MCNC: 113 MG/DL (ref 0–100)
LYMPHOCYTES # BLD: 1.73 K/UL (ref 0.5–4.6)
LYMPHOCYTES NFR BLD: 18.4 % (ref 13–44)
MCH RBC QN AUTO: 28 PG (ref 26.1–32.9)
MCHC RBC AUTO-ENTMCNC: 32.5 G/DL (ref 31.4–35)
MCV RBC AUTO: 86.2 FL (ref 82–102)
MONOCYTES # BLD: 0.99 K/UL (ref 0.1–1.3)
MONOCYTES NFR BLD: 10.5 % (ref 4–12)
NEUTS SEG # BLD: 6.22 K/UL (ref 1.7–8.2)
NEUTS SEG NFR BLD: 66.3 % (ref 43–78)
NRBC # BLD: 0 K/UL (ref 0–0.2)
PLATELET # BLD AUTO: 377 K/UL (ref 150–450)
PMV BLD AUTO: 9.5 FL (ref 9.4–12.3)
POTASSIUM SERPL-SCNC: 4.9 MMOL/L (ref 3.5–5.1)
PROT SERPL-MCNC: 6.9 G/DL (ref 6.3–8.2)
RBC # BLD AUTO: 4.71 M/UL (ref 4.05–5.2)
SODIUM SERPL-SCNC: 138 MMOL/L (ref 136–145)
T4 FREE SERPL-MCNC: 0.8 NG/DL (ref 0.9–1.7)
TRIGL SERPL-MCNC: 157 MG/DL (ref 0–150)
TSH W FREE THYROID IF ABNORMAL: 13.9 UIU/ML (ref 0.27–4.2)
VLDLC SERPL CALC-MCNC: 31 MG/DL (ref 6–23)
WBC # BLD AUTO: 9.4 K/UL (ref 4.3–11.1)

## 2025-05-16 RX ORDER — PANTOPRAZOLE SODIUM 40 MG/1
40 TABLET, DELAYED RELEASE ORAL DAILY PRN
Qty: 90 TABLET | Refills: 3 | Status: SHIPPED | OUTPATIENT
Start: 2025-05-16

## 2025-05-16 RX ORDER — LEVOTHYROXINE SODIUM 175 UG/1
175 TABLET ORAL
Qty: 90 TABLET | Refills: 0 | Status: SHIPPED | OUTPATIENT
Start: 2025-05-16

## 2025-05-16 NOTE — PROGRESS NOTES
NOTICE FOR THE PATIENT: This clinical note is not designed to be interpreted by patients.  These notes may contain candid and (unintentionally) offensive descriptions, which are sometimes required for accurate documentation. If you would like more information about your healthcare, please obtain it directly by myself or my staff. Thank you for your understanding and cooperation.     Edyta No is a 39 y.o. female who presents today for the following:  Chief Complaint   Patient presents with    Annual Exam     Pt presents today for annual exam and labs.          Allergies   Allergen Reactions    Adhesive Tape Rash     Other reaction(s): Rash-Allergy    Escitalopram Other (See Comments)     hallucinations       Current Outpatient Medications   Medication Sig Dispense Refill    levothyroxine (SYNTHROID) 175 MCG tablet Take 1 tablet by mouth every morning (before breakfast) 90 tablet 0    pantoprazole (PROTONIX) 40 MG tablet Take 1 tablet by mouth daily as needed (heartburn) TAKE 1 TABLET BY MOUTH EVERY DAY 90 tablet 3    clonazePAM (KLONOPIN) 0.5 MG tablet Take 1 tablet by mouth 2 times daily as needed for Anxiety for up to 120 days. 60 tablet 3    lamoTRIgine (LAMICTAL) 150 MG tablet Take 1 tablet by mouth 2 times daily 60 tablet 3    buPROPion (WELLBUTRIN XL) 150 MG extended release tablet Take 1 tablet by mouth every morning 30 tablet 3    doxepin (SINEQUAN) 10 MG capsule TAKE 1 CAPSULE BY MOUTH EVERY DAY AT NIGHT 30 capsule 3    venlafaxine (EFFEXOR XR) 150 MG extended release capsule Take 1 capsule by mouth daily 30 capsule 3    zolpidem (AMBIEN) 10 MG tablet Take 1 tablet by mouth nightly as needed for Sleep for up to 120 days. 30 tablet 1    hydrocortisone 2.5 % cream Apply 2.5 mg topically 2 times daily as needed      levonorgestrel (MIRENA) IUD 52 mg 1 Device by IntraUTERine route once      loratadine (CLARITIN) 10 MG tablet Take 1 tablet by mouth daily as needed      albuterol sulfate  (90

## 2025-05-16 NOTE — ASSESSMENT & PLAN NOTE
Chronic, not at goal (unstable), continue current treatment plan and lifestyle modifications recommended    Orders:    Comprehensive Metabolic Panel; Future    Lipid Panel; Future

## 2025-05-16 NOTE — ASSESSMENT & PLAN NOTE
Chronic, at goal (stable), continue current treatment plan and lifestyle modifications recommended    Orders:    Comprehensive Metabolic Panel; Future

## 2025-05-16 NOTE — PATIENT INSTRUCTIONS
Patient Education     Anticipatory Guidance: Encourage sunscreen use and protective clothing to avoid skin cancer.  Encourage regular eye and dental exams.  Recommend regular exercise approximately 150 minutes/week and healthy weight between BMI 18-25.  Encourage healthy diet such as the Mediterranean diet.       Learning About the Mediterranean Diet  What is the Mediterranean diet?     The Mediterranean diet is a style of eating rather than a diet plan. It features foods eaten in [place], [place], southern [place], and other countries along the [place] Sea. It emphasizes eating foods like fish, fruits, vegetables, beans, high-fiber breads and whole grains, nuts, and olive oil. This style of eating includes limited red meat, cheese, and sweets.  Why choose the Mediterranean diet?  A Mediterranean-style diet may improve heart health. It contains more fat than other heart-healthy diets. But the fats are mainly from nuts, unsaturated oils (such as fish oils and olive oil), and certain nut or seed oils (such as canola, soybean, or flaxseed oil). These fats may help protect the heart and blood vessels.  How can you get started on the Mediterranean diet?  Here are some things you can do to switch to a more Mediterranean way of eating.  What to eat  Eat a variety of fruits and vegetables each day, such as grapes, blueberries, tomatoes, broccoli, peppers, figs, olives, spinach, eggplant, beans, lentils, and chickpeas.  Eat a variety of whole-grain foods each day, such as oats, brown rice, and whole wheat bread, pasta, and couscous.  Eat fish at least 2 times a week. Try tuna, salmon, mackerel, lake trout, herring, or sardines.  Eat moderate amounts of low-fat dairy products, such as milk, cheese, or yogurt.  Eat moderate amounts of poultry and eggs.  Choose healthy (unsaturated) fats, such as nuts, olive oil, and certain nut or seed oils like canola, soybean, and flaxseed.  Limit unhealthy (saturated) fats, such

## 2025-05-16 NOTE — ASSESSMENT & PLAN NOTE
Chronic, not at goal (unstable), continue current treatment plan and lifestyle modifications recommended    Orders:    Comprehensive Metabolic Panel; Future    CBC with Auto Differential; Future

## 2025-05-16 NOTE — ASSESSMENT & PLAN NOTE
Chronic, at goal (stable), continue current treatment plan and medication adherence emphasized    Orders:    TSH reflex to FT4; Future    levothyroxine (SYNTHROID) 175 MCG tablet; Take 1 tablet by mouth every morning (before breakfast)

## 2025-05-16 NOTE — ASSESSMENT & PLAN NOTE
Chronic, at goal (stable), continue current treatment plan    Orders:    CBC with Auto Differential; Future    pantoprazole (PROTONIX) 40 MG tablet; Take 1 tablet by mouth daily as needed (heartburn) TAKE 1 TABLET BY MOUTH EVERY DAY

## 2025-06-03 ENCOUNTER — TELEPHONE (OUTPATIENT)
Dept: FAMILY MEDICINE CLINIC | Facility: CLINIC | Age: 40
End: 2025-06-03

## 2025-06-04 ENCOUNTER — RESULTS FOLLOW-UP (OUTPATIENT)
Dept: FAMILY MEDICINE CLINIC | Facility: CLINIC | Age: 40
End: 2025-06-04

## 2025-06-04 DIAGNOSIS — Z79.899 MEDICATION MANAGEMENT: ICD-10-CM

## 2025-06-04 DIAGNOSIS — E06.3 HASHIMOTO'S THYROIDITIS: Primary | ICD-10-CM

## 2025-06-13 RX ORDER — BUPROPION HYDROCHLORIDE 150 MG/1
150 TABLET ORAL EVERY MORNING
Qty: 90 TABLET | Refills: 2 | OUTPATIENT
Start: 2025-06-13

## 2025-07-01 NOTE — PROGRESS NOTES
Depression Father     Heart Disease Paternal Grandfather     Diabetes Mother         type 2    Hypertension Mother     Other Mother         diverticular disease    Anxiety Disorder Mother     Bipolar Disorder Mother     Depression Mother     OCD Mother     Sexual Abuse Mother     ADHD Brother     Alcohol Abuse Brother     Breast Cancer Maternal Grandmother     Dementia Maternal Grandmother     Physical Abuse Maternal Grandmother     Cancer Maternal Grandfather         kidney    Heart Disease Paternal Grandmother     Rheum Arthritis Paternal Grandmother     Hypertension Paternal Grandmother       Social History     Tobacco Use    Smoking status: Former     Current packs/day: 0.00     Types: Cigarettes     Quit date: 12/18/2009     Years since quitting: 15.5    Smokeless tobacco: Never   Substance Use Topics    Alcohol use: Not Currently      Allergies   Allergen Reactions    Adhesive Tape Rash     Other reaction(s): Rash-Allergy    Escitalopram Other (See Comments)     hallucinations         ROS:  No obvious pertinent positives on review of systems except for what was outlined above.       Objective:       BP 98/62   Pulse 84   Ht 1.6 m (5' 3\")   Wt 114.3 kg (251 lb 14.4 oz)   BMI 44.62 kg/m²     BP Readings from Last 3 Encounters:   07/03/25 98/62   05/16/25 124/70   08/09/24 100/62       Wt Readings from Last 3 Encounters:   07/03/25 114.3 kg (251 lb 14.4 oz)   05/16/25 115.7 kg (255 lb)   08/09/24 114.3 kg (252 lb)       General/Constitutional:   Alert and oriented x 3, no acute distress  HEENT:   normocephalic, atraumatic, moist mucous membranes  Neck:   No JVD or carotid bruits bilaterally  Cardiovascular:   regular rate and rhythm, no rub/gallop appreciated  Pulmonary:   clear to auscultation bilaterally, no respiratory distress  Abdomen:   soft, non-tender, non-distended  Ext:   No sig LE edema bilaterally  Skin:  warm and dry, no obvious rashes seen  Neuro:   no obvious sensory or motor

## 2025-07-03 ENCOUNTER — INITIAL CONSULT (OUTPATIENT)
Age: 40
End: 2025-07-03

## 2025-07-03 VITALS
DIASTOLIC BLOOD PRESSURE: 62 MMHG | HEIGHT: 63 IN | BODY MASS INDEX: 44.63 KG/M2 | WEIGHT: 251.9 LBS | SYSTOLIC BLOOD PRESSURE: 98 MMHG | HEART RATE: 84 BPM

## 2025-07-03 DIAGNOSIS — R00.2 PALPITATIONS: Primary | ICD-10-CM

## 2025-07-03 DIAGNOSIS — R07.89 ATYPICAL CHEST PAIN: ICD-10-CM

## 2025-07-03 DIAGNOSIS — R06.00 DYSPNEA, UNSPECIFIED TYPE: ICD-10-CM

## 2025-07-03 PROCEDURE — 99204 OFFICE O/P NEW MOD 45 MIN: CPT | Performed by: INTERNAL MEDICINE

## 2025-07-15 ENCOUNTER — RESULTS FOLLOW-UP (OUTPATIENT)
Age: 40
End: 2025-07-15

## 2025-07-16 NOTE — TELEPHONE ENCOUNTER
----- Message from Dr. Hugo Schwartz MD sent at 7/15/2025  8:43 PM EDT -----  Please let the patient know that the heart function is normal on ECHO.

## 2025-07-16 NOTE — TELEPHONE ENCOUNTER
Left message on voicemail with echo results and Dr. Schwartz's response. In message, advised patient to call with any questions or concerns.

## 2025-07-18 RX ORDER — BUPROPION HYDROCHLORIDE 150 MG/1
150 TABLET ORAL EVERY MORNING
Qty: 90 TABLET | Refills: 2 | OUTPATIENT
Start: 2025-07-18

## (undated) DEVICE — UNIVERSAL FIXATION CANNULA: Brand: VERSAONE

## (undated) DEVICE — E-Z CLEAN, PTFE COATED, ELECTROSURGICAL LAPAROSCOPIC ELECTRODE, J-HOOK, 33 CM., SINGLE-USE, FOR USE WITH HAND CONTROL PENCIL: Brand: MEGADYNE

## (undated) DEVICE — BLADELESS OPTICAL TROCAR WITH FIXATION CANNULA: Brand: VERSAPORT

## (undated) DEVICE — 2000CC GUARDIAN II: Brand: GUARDIAN

## (undated) DEVICE — BUTTON SWITCH PENCIL BLADE ELECTRODE, HOLSTER: Brand: EDGE

## (undated) DEVICE — SUTURE VCRL SZ 4-0 L18IN ABSRB UD L19MM PS-2 3/8 CIR PRIM J496H

## (undated) DEVICE — SUTURE SZ 0 27IN 5/8 CIR UR-6  TAPER PT VIOLET ABSRB VICRYL J603H

## (undated) DEVICE — CLIP APPLIER WITH CLIP LOGIC TECHNOLOGY: Brand: ENDO CLIP III

## (undated) DEVICE — BLUNT TROCAR WITH THREADED ANCHOR: Brand: VERSAONE

## (undated) DEVICE — SYR 50ML LR LCK 1ML GRAD NSAF --

## (undated) DEVICE — (D)PREP SKN CHLRAPRP APPL 26ML -- CONVERT TO ITEM 371833

## (undated) DEVICE — (D)STRIP SKN CLSR 0.5X4IN WHT --

## (undated) DEVICE — BNDG ADHESIVE SHEER LF 1X3IN -- CONVERT TO ITEM 358043

## (undated) DEVICE — CONTAINER SPEC FRMLN 120ML --

## (undated) DEVICE — TISSUE RETRIEVAL SYSTEM: Brand: INZII RETRIEVAL SYSTEM

## (undated) DEVICE — 2, DISPOSABLE SUCTION/IRRIGATOR WITHOUT DISPOSABLE TIP: Brand: STRYKEFLOW

## (undated) DEVICE — VISUALIZATION SYSTEM: Brand: CLEARIFY

## (undated) DEVICE — REM POLYHESIVE ADULT PATIENT RETURN ELECTRODE: Brand: VALLEYLAB

## (undated) DEVICE — KENDALL SCD EXPRESS SLEEVES, KNEE LENGTH, MEDIUM: Brand: KENDALL SCD

## (undated) DEVICE — 3M™ TEGADERM™ TRANSPARENT FILM DRESSING FRAME STYLE, 1624W, 2-3/8 IN X 2-3/4 IN (6 CM X 7 CM), 100/CT 4CT/CASE: Brand: 3M™ TEGADERM™

## (undated) DEVICE — NDL HYPO BVL NSAF 25GX1IN LF --

## (undated) DEVICE — MASTISOL ADHESIVE LIQ 2/3ML

## (undated) DEVICE — LAP CHOLE: Brand: MEDLINE INDUSTRIES, INC.

## (undated) DEVICE — BAG SPEC RETRV 275ML 10ML DISPOSABLE RELIACATCH

## (undated) DEVICE — AMD ANTIMICROBIAL GAUZE SPONGES 8 PLY USP TYPE VII: Brand: CURITY

## (undated) DEVICE — [HIGH FLOW INSUFFLATOR,  DO NOT USE IF PACKAGE IS DAMAGED,  KEEP DRY,  KEEP AWAY FROM SUNLIGHT,  PROTECT FROM HEAT AND RADIOACTIVE SOURCES.]: Brand: PNEUMOSURE

## (undated) DEVICE — SOLUTION IRRIG 3000ML 0.9% SOD CHL FLX CONT 0797208] ICU MEDICAL INC]